# Patient Record
Sex: MALE | Race: WHITE
[De-identification: names, ages, dates, MRNs, and addresses within clinical notes are randomized per-mention and may not be internally consistent; named-entity substitution may affect disease eponyms.]

---
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
## 2018-11-05 NOTE — ED PHYSICIAN DOCUMENTATION
ED Addendum





- Addendum


Addendum: 





11/05/18 12:29 





The patient's care was turned over to me at 7 AM by the off going emergency 

physician.  The patient is currently awaiting placement into a behavioral center

and for social work to provide placement.  After social work had evaluated the 

patient The patient has decided that he does not want to be placed into a 

behavioral health center.  Social work feels that the patient contracts for 

safety and is a safe discharge at this point.  I reviewed the note from 

tele-psychiatry and explained to the patient that it appears that the patient 

would benefit from inpatient therapy since he does not appear safe at this time.

 The patient has decided that he does not want any treatment or transfer at our 

facility.  Per social work the patient is not obtainable.  The patient absconded

from the emergency department prior to discharge instructions being given.

## 2023-02-18 ENCOUNTER — HOSPITAL ENCOUNTER (OUTPATIENT)
Dept: HOSPITAL 76 - EMS | Age: 63
Discharge: ANOTHER HOSPITAL | End: 2023-02-18
Payer: OTHER GOVERNMENT

## 2023-02-18 ENCOUNTER — HOSPITAL ENCOUNTER (EMERGENCY)
Dept: HOSPITAL 76 - ED | Age: 63
Discharge: LEFT BEFORE DISCHARGE | End: 2023-02-18
Payer: OTHER GOVERNMENT

## 2023-02-18 VITALS — SYSTOLIC BLOOD PRESSURE: 138 MMHG | DIASTOLIC BLOOD PRESSURE: 99 MMHG

## 2023-02-18 DIAGNOSIS — R45.851: Primary | ICD-10-CM

## 2023-02-18 DIAGNOSIS — F32.A: ICD-10-CM

## 2023-02-18 DIAGNOSIS — Z87.891: ICD-10-CM

## 2023-02-18 PROCEDURE — 99283 EMERGENCY DEPT VISIT LOW MDM: CPT

## 2023-02-18 NOTE — ED PHYSICIAN DOCUMENTATION
PD HPI MHE





- Stated complaint


Stated Complaint: SI





- History obtained from


History obtained from: Patient





- Additional information


Additional information: 





62-year-old gentleman ran out of his medications recently.  He was on Wellbutrin

100 mg p.o. twice daily, Risperdal 1.5 mg at night, and trazodone 100 mg at 

night.  [facility]  [facility] today and he mentioned that he does have a suicidal ideation.  He says with 

no plan.  They called 911 and he is here for evaluation and treatment.  He 

admits to hopelessness and being in a bad place because of a pending eviction.  

He denies access to firearms, and he denies any suicidal plan.  He has attempted

suicide in the past but its been a few years, 2018.  He declines hospitalization

as he needs to go take care of his dogs Beth.





PD PAST MEDICAL HISTORY





- Past Medical History


GI: GERD


Psych: Bipolar disorder





- Past Surgical History


Past Surgical History: Yes


General: Cholecystectomy


Ortho: Other





- Present Medications


Home Medications: 


                                Ambulatory Orders











 Medication  Instructions  Recorded  Confirmed


 


Omeprazole 20 mg PO DAILY 10/14/16 11/04/18


 


QUEtiapine [SEROquel] 25 mg PO DAILY 10/14/16 11/04/18


 


Gabapentin 100 mg PO TID 11/04/18 11/04/18


 


traZODone [Desyrel] 100 mg PO DAILY PM 11/04/18 11/04/18


 


cephALEXin [Keflex] 500 mg PO BID #14 capsule 11/05/18 


 


Trazodone HCl 100 mg PO QPM #30 tablet 02/18/23 


 


buPROPion [Wellbutrin Sr] 100 mg PO BID #60 tablet 02/18/23 


 


risperiDONE [RisperDAL] 1 mg PO QPM #30 tablet 02/18/23 














- Allergies


Allergies/Adverse Reactions: 


                                    Allergies











Allergy/AdvReac Type Severity Reaction Status Date / Time


 


codeine Allergy  Emesis Verified 11/04/18 19:24














- Social History


Does the pt smoke?: No


Smoking Status: Former smoker


Does the pt drink ETOH?: Yes


Does the pt have substance abuse?: Yes





- Immunizations


Immunizations are current?: Yes





PD ED PE NORMAL





- Vitals


Vital signs reviewed: Yes





- General


General: Alert and oriented X 3, No acute distress





- HEENT


HEENT: PERRL, EOMI





- Neuro


Neuro: Alert and oriented X 3, CNs 2-12 intact, No motor deficit, No sensory 

deficit, Normal speech


Eye Opening: Spontaneous


Motor: Obeys Commands


Verbal: Oriented


GCS Score: 15





- Psych


Psych: Normal mood





Results





- Vitals


Vitals: 


                               Vital Signs - 24 hr











  02/18/23





  15:50


 


Temperature 37.3 C


 


Heart Rate 65


 


Respiratory 18





Rate 


 


Blood Pressure 138/99 H


 


O2 Saturation 96








                                     Oxygen











O2 Source                      Room air

















PD Medical Decision Making





- ED course


ED course: 





62-year-old gentleman presents with vague suicidal ideation without plan.  We 

are awaiting social work consultation when he walked out of the department 

without prescriptions or formal discharge.





Departure





- Departure


Disposition: ED [facility]


Clinical Impression: 


 Depression





Condition: Good


Record reviewed to determine appropriate education?: Yes


Instructions:  ED Depression


Prescriptions: 


risperiDONE [RisperDAL] 1 mg PO QPM #30 tablet


Trazodone HCl 100 mg PO QPM #30 tablet


buPROPion [Wellbutrin Sr] 100 mg PO BID #60 tablet

## 2023-03-31 ENCOUNTER — HOSPITAL ENCOUNTER (OUTPATIENT)
Dept: HOSPITAL 76 - EMS | Age: 63
Discharge: ANOTHER HOSPITAL | End: 2023-03-31
Payer: OTHER GOVERNMENT

## 2023-03-31 ENCOUNTER — HOSPITAL ENCOUNTER (EMERGENCY)
Dept: HOSPITAL 76 - ED | Age: 63
Discharge: HOME | End: 2023-03-31
Payer: OTHER GOVERNMENT

## 2023-03-31 VITALS — DIASTOLIC BLOOD PRESSURE: 99 MMHG | SYSTOLIC BLOOD PRESSURE: 148 MMHG

## 2023-03-31 DIAGNOSIS — Z04.6: Primary | ICD-10-CM

## 2023-03-31 DIAGNOSIS — R45.1: ICD-10-CM

## 2023-03-31 DIAGNOSIS — R45.851: ICD-10-CM

## 2023-03-31 DIAGNOSIS — N30.00: ICD-10-CM

## 2023-03-31 DIAGNOSIS — T50.906A: ICD-10-CM

## 2023-03-31 DIAGNOSIS — Z91.138: ICD-10-CM

## 2023-03-31 DIAGNOSIS — R45.851: Primary | ICD-10-CM

## 2023-03-31 DIAGNOSIS — Z20.822: ICD-10-CM

## 2023-03-31 DIAGNOSIS — F31.9: ICD-10-CM

## 2023-03-31 DIAGNOSIS — Z78.1: ICD-10-CM

## 2023-03-31 LAB
ALBUMIN DIAFP-MCNC: 4.7 G/DL (ref 3.2–5.5)
ALBUMIN/GLOB SERPL: 1.4 {RATIO} (ref 1–2.2)
ALP SERPL-CCNC: 107 IU/L (ref 42–121)
ALT SERPL W P-5'-P-CCNC: 20 IU/L (ref 10–60)
AMPHET UR QL SCN: NEGATIVE
ANION GAP SERPL CALCULATED.4IONS-SCNC: 10 MMOL/L (ref 6–13)
APAP SERPL-MCNC: < 10 UG/ML (ref 10–30)
AST SERPL W P-5'-P-CCNC: 23 IU/L (ref 10–42)
BARBITURATES UR QL SCN>300 NG/ML: NEGATIVE
BASOPHILS NFR BLD AUTO: 0.1 10^3/UL (ref 0–0.1)
BASOPHILS NFR BLD AUTO: 0.8 %
BENZODIAZ UR QL SCN: NEGATIVE
BILIRUB BLD-MCNC: 0.8 MG/DL (ref 0.2–1)
BUN SERPL-MCNC: 13 MG/DL (ref 6–20)
CALCIUM UR-MCNC: 9.8 MG/DL (ref 8.5–10.3)
CHLORIDE SERPL-SCNC: 92 MMOL/L (ref 101–111)
CLARITY UR REFRACT.AUTO: (no result)
CO2 SERPL-SCNC: 32 MMOL/L (ref 21–32)
COCAINE UR-SCNC: NEGATIVE UMOL/L
CREAT SERPLBLD-SCNC: 0.9 MG/DL (ref 0.6–1.2)
EOSINOPHIL # BLD AUTO: 0.1 10^3/UL (ref 0–0.7)
EOSINOPHIL NFR BLD AUTO: 0.8 %
ERYTHROCYTE [DISTWIDTH] IN BLOOD BY AUTOMATED COUNT: 12 % (ref 12–15)
ETHANOL BLD-MCNC: < 5 MG/DL
GFRSERPLBLD MDRD-ARVRAT: 86 ML/MIN/{1.73_M2} (ref 89–?)
GLOBULIN SER-MCNC: 3.3 G/DL (ref 2.1–4.2)
GLUCOSE SERPL-MCNC: 149 MG/DL (ref 70–100)
GLUCOSE UR QL STRIP.AUTO: NEGATIVE MG/DL
HCT VFR BLD AUTO: 47.6 % (ref 42–52)
HGB UR QL STRIP: 16.8 G/DL (ref 14–18)
KETONES UR QL STRIP.AUTO: NEGATIVE MG/DL
LIPASE SERPL-CCNC: 37 U/L (ref 22–51)
LYMPHOCYTES # SPEC AUTO: 1.5 10^3/UL (ref 1.5–3.5)
LYMPHOCYTES NFR BLD AUTO: 19.2 %
MCH RBC QN AUTO: 31.9 PG (ref 27–31)
MCHC RBC AUTO-ENTMCNC: 35.3 G/DL (ref 32–36)
MCV RBC AUTO: 90.3 FL (ref 80–94)
METHADONE UR QL SCN: NEGATIVE
METHAMPHET UR QL SCN: NEGATIVE
MONOCYTES # BLD AUTO: 0.6 10^3/UL (ref 0–1)
MONOCYTES NFR BLD AUTO: 7 %
NEUTROPHILS # BLD AUTO: 5.8 10^3/UL (ref 1.5–6.6)
NEUTROPHILS # SNV AUTO: 8 X10^3/UL (ref 4.8–10.8)
NEUTROPHILS NFR BLD AUTO: 72.1 %
NITRITE UR QL STRIP.AUTO: POSITIVE
NRBC # BLD AUTO: 0 /100WBC
NRBC # BLD AUTO: 0 X10^3/UL
OPIATES UR QL SCN: NEGATIVE
PDW BLD AUTO: 9 FL (ref 7.4–11.4)
PH UR STRIP.AUTO: 6 PH (ref 5–7.5)
PLATELET # BLD: 225 10^3/UL (ref 130–450)
POTASSIUM SERPL-SCNC: 3.6 MMOL/L (ref 3.5–5)
PROT SPEC-MCNC: 8 G/DL (ref 6.7–8.2)
PROT UR STRIP.AUTO-MCNC: (no result) MG/DL
RBC # UR STRIP.AUTO: NEGATIVE /UL
RBC # URNS HPF: (no result) /HPF (ref 0–5)
RBC MAR: 5.27 10^6/UL (ref 4.7–6.1)
SALICYLATES SERPL-MCNC: < 6 MG/DL
SODIUM SERPLBLD-SCNC: 134 MMOL/L (ref 135–145)
SP GR UR STRIP.AUTO: 1.02 (ref 1–1.03)
SQUAMOUS URNS QL MICRO: (no result)
THC UR QL SCN: POSITIVE
UROBILINOGEN UR QL STRIP.AUTO: (no result) E.U./DL
UROBILINOGEN UR STRIP.AUTO-MCNC: NEGATIVE MG/DL
VOLATILE DRUGS POS SERPL SCN: (no result)
WBC # UR MANUAL: >25 /HPF (ref 0–3)

## 2023-03-31 PROCEDURE — 87181 SC STD AGAR DILUTION PER AGT: CPT

## 2023-03-31 PROCEDURE — 80320 DRUG SCREEN QUANTALCOHOLS: CPT

## 2023-03-31 PROCEDURE — 87077 CULTURE AEROBIC IDENTIFY: CPT

## 2023-03-31 PROCEDURE — 81003 URINALYSIS AUTO W/O SCOPE: CPT

## 2023-03-31 PROCEDURE — 81001 URINALYSIS AUTO W/SCOPE: CPT

## 2023-03-31 PROCEDURE — 80329 ANALGESICS NON-OPIOID 1 OR 2: CPT

## 2023-03-31 PROCEDURE — 84443 ASSAY THYROID STIM HORMONE: CPT

## 2023-03-31 PROCEDURE — 87635 SARS-COV-2 COVID-19 AMP PRB: CPT

## 2023-03-31 PROCEDURE — 99284 EMERGENCY DEPT VISIT MOD MDM: CPT

## 2023-03-31 PROCEDURE — 80307 DRUG TEST PRSMV CHEM ANLYZR: CPT

## 2023-03-31 PROCEDURE — 36415 COLL VENOUS BLD VENIPUNCTURE: CPT

## 2023-03-31 PROCEDURE — 99283 EMERGENCY DEPT VISIT LOW MDM: CPT

## 2023-03-31 PROCEDURE — 87086 URINE CULTURE/COLONY COUNT: CPT

## 2023-03-31 PROCEDURE — 96372 THER/PROPH/DIAG INJ SC/IM: CPT

## 2023-03-31 PROCEDURE — 80306 DRUG TEST PRSMV INSTRMNT: CPT

## 2023-03-31 PROCEDURE — 80053 COMPREHEN METABOLIC PANEL: CPT

## 2023-03-31 PROCEDURE — 83690 ASSAY OF LIPASE: CPT

## 2023-03-31 PROCEDURE — 84480 ASSAY TRIIODOTHYRONINE (T3): CPT

## 2023-03-31 PROCEDURE — 85025 COMPLETE CBC W/AUTO DIFF WBC: CPT

## 2023-03-31 PROCEDURE — 84439 ASSAY OF FREE THYROXINE: CPT

## 2023-03-31 NOTE — EXTERNAL MEDICAL SUMMARY RPT
Continuity of Care Document

                            Created on:2023



Patient:HARPAL FULLER

Sex:Male

:1960

External Reference #:[id number]





Demographics







                          Address                   [address] [place] [address]

 

                          Phone                     Unavailable

 

                          Preferred Language        English

 

                          Marital Status            

 

                          Hindu Affiliation     Unknown

 

                          Race                      Unknown

 

                          Ethnic Group              Unknown









Author







                          Organization              [facility]

 

                          [place] [address]

 

                          Phone                     [phone]









Care Team Providers







                    Name                Role                Phone

 

                    Leo Hernandez     Unavailable         Unavailable









Allergies and Intolerances







                 date            description     facility        type

 

                 (no date)       No Known Drug Allergies  [facility]  (unkn

own)







Encounters

No information.



Functional Status

No information.



Immunizations

No information.



Medications

No information.



Problems







                     date                description         facility

 

                     2023 00:00    Unilateral incarcerated inguinal hernia

  [facility]

 

                     2023 08:57    Bilateral inguinal hernia, without obst

ruction  [facility]



                                        or gangrene,        







Procedures







                     date                description         facility

 

                     2023 00:00    US scrotum          [facility]

 

                     2023 00:00    Computed tomography of abdomen and pelv

is  [facility]



                                        without contrast    

 

                     2023 00:00    Hernia Repair - Inguinal (Right)  [facility]







Results/Labs







           test      date      author    facility  value     unit      interpret

ation









                                         Result panel 1









           (unknown)  (no date)  (unknown)  [facility]    (no value)  (units    (unk

nown)



                                        [facility]            unknown)  









                                         Result panel 2









           (unknown)  (no date)  (unknown)  [facility]    (no value)  (units    (unk

nown)



                                        Hospital            unknown)  









                                         Result panel 3









           (unknown)  (no date)  (unknown)  [facility]    (no value)  (units    (unk

nown)



                                        Hospital            unknown)  









                                         Result panel 4









           (unknown)  (no date)  (unknown)  [facility]    (no value)  (units    (unk

nown)



                                        Hospital            unknown)  









                                         Result panel 5









           (unknown)  (no date)  (unknown)  [facility]    (no value[facility]  (units    (unk

nown)



                                        Hospital            unknown)  









                                         Result panel 6









           (unknown)  (no date)  (unknown)  [facility]    (no value)  (units    (unk

nown)



                                        Hospital            unknown)  









                                         Result panel 7









           (unknown)  (no date)  (unknown)  [facility]    (no value)  (units    (unk

nown)



                                        Hospital            unknown)  









                                         Result panel 8









           (unknown)  (no date)  (unknown)  [facility]    (no value)  (units    (unk

nown)



                                        Hospital            unknown)  









                                         Result panel 9









           (unknown)  (no date)  (unknown)  [facility]    (no value[facility]  (units    (unk

nown)



                                        Hospital            unknown)  









                                         Result panel 10









           (unknown)  (no date)  (unknown)  [facility]    (no value)  (units    (unk

nown)



                                        Hospital            unknown)  









                                         Result panel 11









           (unknown)  (no date)  (unknown)  [facility]    (no value[facility]  (units    (unk

nown)



                                        Hospital            unknown)  









                                         Result panel 12









           (unknown)  (no date)  (unknown)  [facility]    (no value[facility]  (units    (unk

nown)



                                        Hospital            unknown)  









                                         Result panel 13









           (unknown)  (no date)  (unknown)  [facility]    (no value)  (units    (unk

nown)



                                        Hospital            unknown)  









                                         Result panel 14









           (unknown)  (no date)  (unknown)  [facility]    (no value)  (units    (unk

nown)



                                        Hospital            unknown)  









                                         Result panel 15









           (unknown)  (no date)  (unknown)  [facility]    (no value)  (units    (unk

nown)



                                        Hospital            unknown)  









                                         Result panel 16









           (unknown)  (no date)  (unknown)  [facility]    (no value)  (units    (unk

nown)



                                        Hospital            unknown)  









                                         Result panel 17









           (unknown)  (no date)  (unknown)  [facility]    (no value)  (units    (unk

nown)



                                        Hospital            unknown)  









                                         Result panel 18









           (unknown)  (no date)  (unknown)  [facility]    (no value)  (units    (unk

nown)



                                        Hospital            unknown)  









                                         Result panel 19









           (unknown)  (no date)  (unknown)  [facility]    (no value)  (units    (unk

nown)



                                        Hospital            unknown)  









                                         Result panel 20









           (unknown)  (no date)  (unknown)  [facility]    (no value)  (units    (unk

nown)



                                        Hospital            unknown)  









                                         Result panel 21









           (unknown)  (no date)  (unknown)  [facility]    (no value)  (units    (unk

nown)



                                        Hospital            unknown)  









                                         Result panel 22









           (unknown)  (no date)  (unknown)  [facility]    (no value[facility]  (units    (unk

nown)



                                        Hospital            unknown)  









                                         Result panel 23









           (unknown)  (no date)  (unknown)  [facility]    (no value[facility]  (units    (unk

nown)



                                        Hospital            unknown)  









                                         Result panel 24









           (unknown)  (no date)  (unknown)  [facility] value[facility]  (units    (unk

nown)



                                        Hospital            unknown)  









                                         Result panel 25









           (unknown)  (no date)  (unknown)  [facility]    (no value[facility]  (units    (unk

nown)



                                        Hospital            unknown)  









                                         Result panel 26









           (unknown)  (no date)  (unknown)  [facility]    (no value[facility]  (units    (unk

nown)



                                        Hospital            unknown)  









                                         Result panel 27









           (unknown)  (no date)  (unknown)  [facility] value[facility]  (units    (unk

nown)



                                        Hospital            unknown)  









                                         Result panel 28









           (unknown)  (no date)  (unknown)  [facility]    (no value[facility]  (units    (unk

nown)



                                        Hospital            unknown)  









                                         Result panel 29









           (unknown)  (no date)  (unknown)  [facility]    (no value[facility]  (units    (unk

nown)



                                        Hospital            unknown)  









                                         Result panel 30









           (unknown)  (no date)  (unknown)  [facility]    (no value[facility]  (units    (unk

nown)



                                        Hospital            unknown)  









                                         Result panel 31









           (unknown)  (no date)  (unknown)  [facility]    (no value)  (units    (unk

nown)



                                        Hospital            unknown)  









                                         Result panel 32









           (unknown)  (no date)  (unknown)  [facility]    (no value[facility]  (units    (unk

nown)



                                        Hospital            unknown)  









                                         Result panel 33









           (unknown)  (no date)  (unknown)  [facility]    (no value[facility]  (units    (unk

nown)



                                        Hospital            unknown)  









                                         Result panel 34









           (unknown)  (no date)  (unknown)  [facility]    (no value[facility]  (units    (unk

nown)



                                        Hospital            unknown)  









                                         Result panel 35









           (unknown)  (no date)  (unknown)  [facility]    (no value[facility]  (units    (unk

nown)



                                        Hospital            unknown)  









                                         Result panel 36









           (unknown)  (no date)  (unknown)  [facility] value[facility]  (units    (unk

nown)



                                        Hospital            unknown)  









                                         Result panel 37









           (unknown)  (no date)  (unknown)  [facility] value[facility]  (units    (unk

nown)



                                        Hospital            unknown)  









                                         Result panel 38









           (unknown)  (no date)  (unknown)  [facility]    (no value[facility]  (units    (unk

nown)



                                        Hospital            unknown)  









                                         Result panel 39









           (unknown)  (no date)  (unknown)  [facility]    (no value[facility]  (units    (unk

nown)



                                        Hospital            unknown)  









                                         Result panel 40









           (unknown)  (no date)  (unknown)  [facility] value[facility]  (units    (unk

nown)



                                        Hospital            unknown)  









                                         Result panel 41









           (unknown)  (no date)  (unknown)  [facility] value[facility]  (units    (unk

nown)



                                        Hospital            unknown)  









                                         Result panel 42









           (unknown)  (no date)  (unknown)  [facility] value[facility]  (units    (unk

nown)



                                        Hospital            unknown)  









                                         Result panel 43









           (unknown)  (no date)  (unknown)  [facility] value[facility]  (units    (unk

nown)



                                        Hospital            unknown)  









                                         Result panel 44









           (unknown)  (no date)  (unknown)  [facility]    (no value[facility]  (units    (unk

nown)



                                        Hospital            unknown)  









                                         Result panel 45









           (unknown)  (no date)  (unknown)  [facility]    (no value)  (units    (unk

nown)



                                        Hospital            unknown)  









                                         Result panel 46









           (unknown)  (no date)  (unknown)  [facility]    (no value)  (units    (unk

nown)



                                        Hospital            unknown)  









                                         Result panel 47









           (unknown)  (no date)  (unknown)  [facility]    (no value)  (units    (unk

nown)



                                        Hospital            unknown)  









                                         Result panel 48









           (unknown)  (no date)  (unknown)  [facility]    (no value)  (units    (unk

nown)



                                        Hospital            unknown)  









                                         Result panel 49









           (unknown)  (no date)  (unknown)  [facility]    (no value)  (units    (unk

nown)



                                        Hospital            unknown)  









                                         Result panel 50









           (unknown)  (no date)  (unknown)  [facility]    (no value)  (units    (unk

nown)



                                        Hospital            unknown)  









                                         Result panel 51









           (unknown)  (no date)  (unknown)  [facility]    (no value)  (units    (unk

nown)



                                        Hospital            unknown)  









                                         Result panel 52









           (unknown)  (no date)  (unknown)  [facility]    (no value)  (units    (unk

nown)



                                        Hospital            unknown)  









                                         Result panel 53









           (unknown)  (no date)  (unknown)  [facility]    (no value)  (units    (unk

nown)



                                        Hospital            unknown)  









                                         Result panel 54









           (unknown)  (no date)  (unknown)  [facility]    (no value)  (units    (unk

nown)



                                        Hospital            unknown)  









                                         Result panel 55









           (unknown)  (no date)  (unknown)  [facility]    (no value)  (units    (unk

nown)



                                        Hospital            unknown)  









                                         Result panel 56









           (unknown)  (no date)  (unknown)  [facility]    (no value)  (units    (unk

nown)



                                        Hospital            unknown)  









                                         Result panel 57









           (unknown)  (no date)  (unknown)  [facility]    (no value)  (units    (unk

nown)



                                        Hospital            unknown)  









                                         Result panel 58









           (unknown)  (no date)  (unknown)  [facility]    (no value)  (units    (unk

nown)



                                        Hospital            unknown)  









                                         Result panel 59









           (unknown)  (no date)  (unknown)  [facility]    (no value)  (units    (unk

nown)



                                        Hospital            unknown)  









                                         Result panel 60









           (unknown)  (no date)  (unknown)  [facility]    (no value)  (units    (unk

nown)



                                        Hospital            unknown)  









                                         Result panel 61









           (unknown)  (no date)  (unknown)  [facility]    (no value)  (units    (unk

nown)



                                        Hospital            unknown)  









                                         Result panel 62









           (unknown)  (no date)  (unknown)  [facility]    (no value)  (units    (unk

nown)



                                        Hospital            unknown)  









                                         Result panel 63









           (unknown)  (no date)  (unknown)  [facility]    (no value)  (units    (unk

nown)



                                        Hospital            unknown)  









                                         Result panel 64









           (unknown)  (no date)  (unknown)  [facility]    (no value)  (units    (unk

nown)



                                        Hospital            unknown)  









                                         Result panel 65









           (unknown)  (no date)  (unknown)  [facility]    (no value)  (units    (unk

nown)



                                        Hospital            unknown)  









                                         Result panel 66









           (unknown)  (no date)  (unknown)  [facility]    (no value)  (units    (unk

nown)



                                        Hospital            unknown)  









                                         Result panel 67









           (unknown)  (no date)  (unknown)  [facility]    (no value)  (units    (unk

nown)



                                        Hospital            unknown)  









                                         Result panel 68









           (unknown)  (no date)  (unknown)  [facility]    (no value)  (units    (unk

nown)



                                        Hospital            unknown)  









                                         Result panel 69









           (unknown)  (no date)  (unknown)  [facility]    (no value)  (units    (unk

nown)



                                        Hospital            unknown)  









                                         Result panel 70









           (unknown)  (no date)  (unknown)  [facility]    (no value)  (units    (unk

nown)



                                        Hospital            unknown)  









                                         Result panel 71









           (unknown)  (no date)  (unknown)  [facility]    (no value)  (units    (unk

nown)



                                        Hospital            unknown)  









                                         Result panel 72









           (unknown)  (no date)  (unknown)  [facility]    (no value)  (units    (unk

nown)



                                        Hospital            unknown)  









                                         Result panel 73









           (unknown)  (no date)  (unknown)  [facility]    (no value)  (units    (unk

nown)



                                        Hospital            unknown)  









                                         Result panel 74









           (unknown)  (no date)  (unknown)  [facility]    (no value)  (units    (unk

nown)



                                        Hospital            unknown)  









                                         Result panel 75









           (unknown)  (no date)  (unknown)  [facility]    (no value)  (units    (unk

nown)



                                        Hospital            unknown)  









                                         Result panel 76









           (unknown)  (no date)  (unknown)  [facility]    (no value)  (units    (unk

nown)



                                        Hospital            unknown)  









                                         Result panel 77









           (unknown)  (no date)  (unknown)  [facility]    (no value)  (units    (unk

nown)



                                        Hospital            unknown)  









                                         Result panel 78









           (unknown)  (no date)  (unknown)  (unknown)  (no value)  (units    (un

known)



                                                            unknown)  

 

           (unknown)  (no date)  (unknown)  (unknown)  010       (units    (unkn

own)



                                                            unknown)  

 

           (unknown)  (no date)  (unknown)  (unknown)  23  (units    (unkn

own)



                                                            unknown)  

 

           (unknown)  (no date)  (unknown)  (unknown)  21:25     (units    (unkn

own)



                                                            unknown)  

 

           (unknown)  (no date)  (unknown)  (unknown)  Age/Sex: 62 /  (units    

(unknown)



                                                  M         unknown)  

 

           (unknown)  (no date)  (unknown)  (unknown)  Allergies  (units    (unk

nown)



                                                            unknown)  

 

           (unknown)  (no date)  (unknown)  (unknown)  Allergy/AdvRea  (units   

 (unknown)



                                                  c Type Severity unknown)  



                                                  Reaction Status           



                                                  Date / Time           

 

           (unknown)  (no date)  (unknown)  (unknown)  Blood Pressure  (units   

 (unknown)



                                                  142/92 H  unknown)  



                                                  23 21:25           

 

           (unknown)  (no date)  (unknown)  (unknown)  Blood Pressure  (units   

 (unknown)



                                                  142/92 H  unknown)  

 

           (unknown)  (no date)  (unknown)  (unknown)  Chief     (units    (unkn

own)



                                                  complaint: unknown)  



                                                  Abdominal Pain           

 

           (unknown)  (no date)  (unknown)  (unknown)  Course    (units    (unkn

own)



                                                            unknown)  

 

           (unknown)  (no date)  (unknown)  (unknown)  :      (units    (unkn

own)



                                                  1960 unknown)  



                                                  Acct:[id number]           

 

           (unknown)  (no date)  (unknown)  (unknown)  Date of   (units    (unkn

own)



                                                  Service:  unknown)  



                                                  23            

 

           (unknown)  (no date)  (unknown)  (unknown)  ER Physician:  (units    

(unknown)



                                                  Leo Hernandez unknown)  



                                                  D.O.                

 

           (unknown)  (no date)  (unknown)  (unknown)  Emergency  (units    (unk

nown)



                                                  Report    unknown)  

 

           (unknown)  (no date)  (unknown)  (unknown)  Exam      (units    (unkn

own)



                                                            unknown)  

 

           (unknown)  (no date)  (unknown)  (unknown)  General   (units    (unkn

own)



                                                            unknown)  

 

           (unknown)  (no date)  (unknown)  (unknown)  HPI - General  (units    

(unknown)



                                                  Adult     unknown)  

 

           (unknown)  (no date)  (unknown)  (unknown)  Initial Vital  (units    

(unknown)



                                                  Signs     unknown)  

 

           (unknown)  (no date)  (unknown)  (unknown)  Initial Vital  (units    

(unknown)



                                                  Signs:    unknown)  

 

           (unknown)  (no date)  (unknown)  (unknown)  [facility]    (units    (unkn

own)



                                                  [facility] unknown)  



                                                  [address] [place] [address]           



                                                  [id number]               

 

           (unknown)  (no date)  (unknown)  (unknown)  Mode of   (units    (unkn

own)



                                                  arrival: EMS unknown)  

 

           (unknown)  (no date)  (unknown)  (unknown)  No Known Drug  (units    

(unknown)



                                                  Allergies unknown)  



                                                  Allergy             



                                                  Verified            



                                                  23 21:43           

 

           (unknown)  (no date)  (unknown)  (unknown)  Oxygen    (units    (unkn

own)



                                                  Delivery Method unknown)  



                                                  23 21:25           

 

           (unknown)  (no date)  (unknown)  (unknown)  Oxygen    (units    (unkn

own)



                                                  Delivery Method unknown)  



                                                  Room Air            

 

           (unknown)  (no date)  (unknown)  (unknown)  Patient:  (units    (unkn

own)



                                                  Harpal Fuller unknown)  



                                                  MR#: [id number]           

 

           (unknown)  (no date)  (unknown)  (unknown)  Pulse Oximetry  (units   

 (unknown)



                                                  94 23 unknown)  



                                                  21:25               

 

           (unknown)  (no date)  (unknown)  (unknown)  Pulse Oximetry  (units   

 (unknown)



                                                  94        unknown)  

 

           (unknown)  (no date)  (unknown)  (unknown)  Pulse Rate 73  (units    

(unknown)



                                                  23 21:25 unknown)  

 

           (unknown)  (no date)  (unknown)  (unknown)  Pulse Rate 73  (units    

(unknown)



                                                            unknown)  

 

           (unknown)  (no date)  (unknown)  (unknown)  Related Data  (units    (

unknown)



                                                            unknown)  

 

           (unknown)  (no date)  (unknown)  (unknown)  Respiratory  (units    (u

nknown)



                                                  Rate 20   unknown)  



                                                  23 21:25           

 

           (unknown)  (no date)  (unknown)  (unknown)  Respiratory  (units    (u

nknown)



                                                  Rate 20   unknown)  

 

           (unknown)  (no date)  (unknown)  (unknown)  Signed By:  (units    (un

known)



                                                            unknown)  

 

           (unknown)  (no date)  (unknown)  (unknown)  Source:   (units    (unkn

own)



                                                  patient   unknown)  

 

           (unknown)  (no date)  (unknown)  (unknown)  Stated    (units    (unkn

own)



                                                  complaint: RLQ/ unknown)  



                                                  Testical pain           

 

           (unknown)  (no date)  (unknown)  (unknown)  Temperature  (units    (u

nknown)



                                                  98.2 F 23 unknown)  



                                                  21:25               

 

           (unknown)  (no date)  (unknown)  (unknown)  Temperature  (units    (u

nknown)



                                                  98.2 F    unknown)  

 

           (unknown)  (no date)  (unknown)  (unknown)  Time Seen by  (units    (

unknown)



                                                  Provider: unknown)  



                                                  23 21:58           

 

           (unknown)  (no date)  (unknown)  (unknown)  Vital Signs -  (units    

(unknown)



                                                  8 hr      unknown)  

 

           (unknown)  (no date)  (unknown)  (unknown)  Vital Signs  (units    (u

nknown)



                                                            unknown)  

 

           (unknown)  (no date)  (unknown)  (unknown)  Vital signs:  (units    (

unknown)



                                                            unknown)  









                                         Result panel 79









           (unknown)  (no date)  (unknown)  (unknown)  0.7       %         (unkn

own)

 

           (unknown)  (no date)  (unknown)  (unknown)  1.8       %         (unkn

own)

 

           (unknown)  (no date)  (unknown)  (unknown)  100       /ul       (unkn

own)

 

           (unknown)  (no date)  (unknown)  (unknown)  14.0      %         (unkn

own)

 

           (unknown)  (no date)  (unknown)  (unknown)  16.3      g/dl      (unkn

own)

 

           (unknown)  (no date)  (unknown)  (unknown)  1700      /ul       (unkn

own)

 

           (unknown)  (no date)  (unknown)  (unknown)  19.6      %         (unkn

own)

 

           (unknown)  (no date)  (unknown)  (unknown)  200       /ul       (unkn

own)

 

           (unknown)  (no date)  (unknown)  (unknown)  205       x10 3/ul  (unkn

own)

 

           (unknown)  (no date)  (unknown)  (unknown)  31.7      pg        (unkn

own)

 

           (unknown)  (no date)  (unknown)  (unknown)  34.9      %         (unkn

own)

 

           (unknown)  (no date)  (unknown)  (unknown)  46.6      %         (unkn

own)

 

           (unknown)  (no date)  (unknown)  (unknown)  5.13      x10 6/ul  (unkn

own)

 

           (unknown)  (no date)  (unknown)  (unknown)  500       /ul       (unkn

own)

 

           (unknown)  (no date)  (unknown)  (unknown)  6.2       %         (unkn

own)

 

           (unknown)  (no date)  (unknown)  (unknown)  6300      /ul       (unkn

own)

 

           (unknown)  (no date)  (unknown)  (unknown)  71.7      %         (unkn

own)

 

           (unknown)  (no date)  (unknown)  (unknown)  8.8       x10 3/ul  (unkn

own)

 

           (unknown)  (no date)  (unknown)  (unknown)  90.8      fl        (unkn

own)









                                         Result panel 80









           (unknown)  (no date)  (unknown)  (unknown)  > 60      ml/min    (unkn

own)

 

           (unknown)  (no date)  (unknown)  (unknown)  > 60      ml/min    (unkn

own)

 

           (unknown)  (no date)  (unknown)  (unknown)  0.81      mg/dl     (unkn

own)

 

           (unknown)  (no date)  (unknown)  (unknown)  105       mmol/l    (unkn

own)

 

           (unknown)  (no date)  (unknown)  (unknown)  12        mg/dl     (unkn

own)

 

           (unknown)  (no date)  (unknown)  (unknown)  138       mmol/l    (unkn

own)

 

           (unknown)  (no date)  (unknown)  (unknown)  14.8      (units unknown)

  (unknown)

 

           (unknown)  (no date)  (unknown)  (unknown)  192       mg/dl     (unkn

own)

 

           (unknown)  (no date)  (unknown)  (unknown)  192       mg/dl     (unkn

own)

 

           (unknown)  (no date)  (unknown)  (unknown)  21        mmol/l    (unkn

own)

 

           (unknown)  (no date)  (unknown)  (unknown)  4.2       mmol/l    (unkn

own)

 

           (unknown)  (no date)  (unknown)  (unknown)  4.2       mmol/l    (unkn

own)

 

           (unknown)  (no date)  (unknown)  (unknown)  9.2       mg/dl     (unkn

own)









                                         Result panel 81









           (unknown)  (no date)  (unknown)  (unknown)  Negative  (units    (unkn

own)



                                                            unknown)  

 

           (unknown)  (no date)  (unknown)  (unknown)  Negative  (units    (unkn

own)



                                                            unknown)  









                                         Result panel 82









           (unknown)  (no       (unknown)  (unknown)  (no value)  (units    (unk

nown)



                    date)                                   unknown)  

 

           (unknown)  (no       (unknown)  (unknown)  23  (units    (unkno

wn)



                    date)                                   unknown)  

 

           (unknown)  (no       (unknown)  (unknown)  1.        (units    (unkno

wn)



                    date)                         Bowel-containing unknown)  



                                                  inguinal hernia           



                                                  demonstrated           



                                                  lateral to the           



                                                  right               

 

           (unknown)  (no       (unknown)  (unknown)  1. Small right  (units    

(unknown)



                    date)                         inguinal hernia unknown)  



                                                  with partial           



                                                  herniation of a           



                                                  small bowel loop.           

 

           (unknown)  (no       (unknown)  (unknown)  [address]  (units  

  (unknown)



                    date)                                   unknown)  

 

           (unknown)  (no       (unknown)  (unknown)  2. Mild segmental  (units 

   (unknown)



                    date)                         wall thickening in unknown)  



                                                  the sigmoid colon           



                                                  suggestive of a           



                                                  mild                

 

           (unknown)  (no       (unknown)  (unknown)  3. Colonic  (units    (unk

nown)



                    date)                         diverticulosis unknown)  



                                                  without definite           



                                                  acute               



                                                  diverticulitis.           

 

           (unknown)  (no       (unknown)  (unknown)  4. No evidence of  (units 

   (unknown)



                    date)                         appendicitis. unknown)  

 

           (unknown)  (no       (unknown)  (unknown)  9010      (units    (unkno

wn)



                    date)                                   unknown)  

 

           (unknown)  (no       (unknown)  (unknown)  ABDOMEN:  (units    (unkno

wn)



                    date)                                   unknown)  

 

           (unknown)  (no       (unknown)  (unknown)  Abdominal Nodes:  (units  

  (unknown)



                    date)                         No retroperitoneal unknown)  



                                                  or mesenteric           



                                                  adenopathy by size           



                                                  criteria.           

 

           (unknown)  (no       (unknown)  (unknown)  Accession Number:  (units 

   (unknown)



                    date)                         [id number] unknown)  

 

           (unknown)  (no       (unknown)  (unknown)  Accession Number:  (units 

   (unknown)



                    date)                         [id number] unknown)  

 

           (unknown)  (no       (unknown)  (unknown)  Adrenal Glands:  (units   

 (unknown)



                    date)                         No adrenal unknown)  



                                                  nodules.            

 

           (unknown)  (no       (unknown)  (unknown)  Age/Sex: 62 / M  (units   

 (unknown)



                    date)                         Date of Service: unknown)  

 

           (unknown)  (no       (unknown)  (unknown)  [place]  (units    (

unknown)



                    date)                         [address]     unknown)  

 

           (unknown)  (no       (unknown)  (unknown)  Approved by:  (units    (u

nknown)



                    date)                         Dmitri Argueta, unknown)  



                                                  M.DAniceto on 2023           



                                                  at 2:06             

 

           (unknown)  (no       (unknown)  (unknown)  Approved by:  (units    (u

nknown)



                    date)                         Dmitri Argueta, unknown)  



                                                  MAnicetoDAniceto on 2023           



                                                  at 2:23             

 

           (unknown)  (no       (unknown)  (unknown)  Axial sections  (units    

(unknown)



                    date)                         were acquired from unknown)  



                                                  the lung bases to           



                                                  the pubic           



                                                  symphysis.           

 

           (unknown)  (no       (unknown)  (unknown)  Biliary ducts: No  (units 

   (unknown)



                    date)                         biliary ductal unknown)  



                                                  dilatation.           

 

           (unknown)  (no       (unknown)  (unknown)  Bladder:  (units    (unkno

wn)



                    date)                         Unremarkable. unknown)  

 

           (unknown)  (no       (unknown)  (unknown)  Bones: Visualized  (units 

   (unknown)



                    date)                         osseous structures unknown)  



                                                  demonstrate no           



                                                  suspicious focal           



                                                  lesions.            

 

           (unknown)  (no       (unknown)  (unknown)  COMPARISON: None.  (units 

   (unknown)



                    date)                                   unknown)  

 

           (unknown)  (no       (unknown)  (unknown)  CT Scan Report  (units    

(unknown)



                    date)                                   unknown)  

 

           (unknown)  (no       (unknown)  (unknown)  Color and pulse  (units   

 (unknown)



                    date)                         Doppler   unknown)  



                                                  interrogation was           



                                                  performed of both           



                                                  testicles.           

 

           (unknown)  (no       (unknown)  (unknown)  Coronal and  (units    (un

known)



                    date)                                   unknown)  

 

           (unknown)  (no       (unknown)  (unknown)  : 1960  (units   

 (unknown)



                    date)                         Acct:[id number] unknown)  

 

           (unknown)  (no       (unknown)  (unknown)  Dictated by:  (units    (u

nknown)



                    date)                         Dmitri Argueta, unknown)  



                                                  M.D. on 2023           



                                                  at 1:44             

 

           (unknown)  (no       (unknown)  (unknown)  Dictated by:  (units    (u

nknown)



                    date)                         Dmitri Argueta, unknown)  



                                                  M.D. on 2023           



                                                  at 2:18             

 

           (unknown)  (no       (unknown)  (unknown)  Doppler: Color  (units    

(unknown)



                    date)                         and pulse Doppler unknown)  



                                                  demonstrate normal           



                                                  and symmetric           



                                                  arterial            

 

           (unknown)  (no       (unknown)  (unknown)  Epididymis is  (units    (

unknown)



                    date)                         normal in overall unknown)  



                                                  size and            



                                                  morphology. No           



                                                  hydrocele or           

 

           (unknown)  (no       (unknown)  (unknown)  FINDINGS:  (units    (unkn

own)



                    date)                                   unknown)  

 

           (unknown)  (no       (unknown)  (unknown)  Gallbladder:  (units    (u

nknown)



                    date)                         Surgically absent. unknown)  

 

           (unknown)  (no       (unknown)  (unknown)  Heart: Heart is  (units   

 (unknown)



                    date)                         normal in size. unknown)  



                                                  There is a small           



                                                  hiatal hernia.           

 

           (unknown)  (no       (unknown)  (unknown)  IMPRESSION:  (units    (un

known)



                    date)                                   unknown)  

 

           (unknown)  (no       (unknown)  (unknown)  INDICATIONS: PAIN  (units 

   (unknown)



                    date)                                   unknown)  

 

           (unknown)  (no       (unknown)  (unknown)  INDICATIONS: abd  (units  

  (unknown)



                    date)                         pain      unknown)  

 

           (unknown)  (no       (unknown)  (unknown)  Image quality:  (units    

(unknown)



                    date)                         Excellent. unknown)  

 

           (unknown)  (no       (unknown)  (unknown)  [facility]  (units   

 (unknown)



                    date)                                   unknown)  

 

           (unknown)  (no       (unknown)  (unknown)  Kidneys and  (units    (un

known)



                    date)                         Ureters: No unknown)  



                                                  hydronephrosis.           



                                                  There is a           



                                                  nonobstructing           



                                                  stone               

 

           (unknown)  (no       (unknown)  (unknown)  Left: Testicle  (units    

(unknown)



                    date)                         measures 3.5 x 2.0 unknown)  



                                                  x 2.2 cm and           



                                                  appears             



                                                  homogeneous in           

 

           (unknown)  (no       (unknown)  (unknown)  Liver:    (units    (unkno

wn)



                    date)                         Noncontrast unknown)  



                                                  evaluation of the           



                                                  liver demonstrates           



                                                  no discrete           



                                                  hepatic             

 

           (unknown)  (no       (unknown)  (unknown)  Loc: AC 90B-1  (units    (

unknown)



                    date)                                   unknown)  

 

           (unknown)  (no       (unknown)  (unknown)  Lung bases: There  (units 

   (unknown)



                    date)                         is mild dependent unknown)  



                                                  atelectasis.           

 

           (unknown)  (no       (unknown)  (unknown)  Miscellaneous:  (units    

(unknown)



                    date)                         There is a small unknown)  



                                                  right inguinal           



                                                  hernia with           



                                                  partial herniation           

 

           (unknown)  (no       (unknown)  (unknown)  No        (units    (unkno

wn)



                    date)                                   unknown)  

 

           (unknown)  (no       (unknown)  (unknown)  Ordering  (units    (unkno

wn)



                    date)                         Provider: unknown)  



                                                  Leo Hernandez D.O.                

 

           (unknown)  (no       (unknown)  (unknown)  Overlying scrotal  (units 

   (unknown)



                    date)                         skin is normal in unknown)  



                                                  thickness. There           



                                                  is a bowel           



                                                  containing           

 

           (unknown)  (no       (unknown)  (unknown)  Overlying scrotal  (units 

   (unknown)



                    date)                         skin is normal in unknown)  



                                                  thickness.           

 

           (unknown)  (no       (unknown)  (unknown)  PELVIS:   (units    (unkno

wn)



                    date)                                   unknown)  

 

           (unknown)  (no       (unknown)  (unknown)  PROCEDURE: CT  (units    (

unknown)



                    date)                         ABDOMEN PELVIS WO unknown)  



                                                  CON                 

 

           (unknown)  (no       (unknown)  (unknown)  PROCEDURE: US  (units    (

unknown)



                    date)                         SCROTUM   unknown)  

 

           (unknown)  (no       (unknown)  (unknown)  Pancreas: There  (units   

 (unknown)



                    date)                         is mild dilatation unknown)  



                                                  of the pancreatic           



                                                  duct in the           



                                                  pancreatic           

 

           (unknown)  (no       (unknown)  (unknown)  Patient:  (units    (unkno

wn)



                    date)                         Harpal Fuller MR#: unknown)  



                                                  [id number]              

 

           (unknown)  (no       (unknown)  (unknown)  Pelvic Nodes: No  (units  

  (unknown)



                    date)                         enlarged lymph unknown)  



                                                  nodes.              

 

           (unknown)  (no       (unknown)  (unknown)  Pelvic Organs:  (units    

(unknown)



                    date)                         Unremarkable. unknown)  

 

           (unknown)  (no       (unknown)  (unknown)  Peritoneum: No  (units    

(unknown)



                    date)                         abnormal  unknown)  



                                                  intraperitoneal           



                                                  fluid. No free           



                                                  air.                

 

           (unknown)  (no       (unknown)  (unknown)  Procedure: CT  (units    (

unknown)



                    date)                         abdomen pelvis wo unknown)  



                                                  con                 

 

           (unknown)  (no       (unknown)  (unknown)  Procedure: US  (units    (

unknown)



                    date)                         scrotum   unknown)  

 

           (unknown)  (no       (unknown)  (unknown)  Real-time  (units    (unkn

own)



                    date)                         scanning was unknown)  



                                                  performed of the           



                                                  scrotum and           



                                                  testicles, with           



                                                  image               

 

           (unknown)  (no       (unknown)  (unknown)  Right: Testicle  (units   

 (unknown)



                    date)                         measures 4.1 x 2.4 unknown)  



                                                  x 2.6 cm and           



                                                  appears homogenous           



                                                  in                  

 

           (unknown)  (no       (unknown)  (unknown)  Signed    (units    (unkno

wn)



                    date)                                   unknown)  

 

           (unknown)  (no       (unknown)  (unknown)  Spleen: Normal in  (units 

   (unknown)



                    date)                         size.     unknown)  

 

           (unknown)  (no       (unknown)  (unknown)  Stomach and  (units    (un

known)



                    date)                         Bowel: Stomach and unknown)  



                                                  small bowel loops           



                                                  are normal in           



                                                  caliber and           

 

           (unknown)  (no       (unknown)  (unknown)  TECHNIQUE:  (units    (unk

nown)



                    date)                                   unknown)  

 

           (unknown)  (no       (unknown)  (unknown)  Ultrasound Report  (units 

   (unknown)



                    date)                                   unknown)  

 

           (unknown)  (no       (unknown)  (unknown)  Ventral Wall: No  (units  

  (unknown)



                    date)                         hernia.   unknown)  

 

           (unknown)  (no       (unknown)  (unknown)  Vessels: Aorta  (units    

(unknown)



                    date)                         and inferior vena unknown)  



                                                  cava are normal in           



                                                  size.               

 

           (unknown)  (no       (unknown)  (unknown)  approximately  (units    (

unknown)



                    date)                         500-600 [place] unknown)  



                                                  units.              

 

           (unknown)  (no       (unknown)  (unknown)  automated  (units    (unkn

own)



                    date)                         exposure control, unknown)  



                                                  adjustment of mA           



                                                  and/or kV           



                                                  according to           



                                                  patient             

 

           (unknown)  (no       (unknown)  (unknown)  bowel loop. No  (units    

(unknown)



                    date)                         evidence of unknown)  



                                                  associated bowel           



                                                  obstruction or           



                                                  strangulation.           

 

           (unknown)  (no       (unknown)  (unknown)  colitis.  (units    (unkno

wn)



                    date)                                   unknown)  

 

           (unknown)  (no       (unknown)  (unknown)  demonstrated  (units    (u

nknown)



                    date)                                   unknown)  

 

           (unknown)  (no       (unknown)  (unknown)  diverticulosis  (units    

(unknown)



                    date)                                   unknown)  

 

           (unknown)  (no       (unknown)  (unknown)  documentation.  (units    

(unknown)



                    date)                                   unknown)  

 

           (unknown)  (no       (unknown)  (unknown)  echotexture.  (units    (u

nknown)



                    date)                                   unknown)  

 

           (unknown)  (no       (unknown)  (unknown)  evidence of  (units    (un

known)



                    date)                         associated bowel unknown)  



                                                  obstruction or           



                                                  definite            



                                                  strangulation.           

 

           (unknown)  (no       (unknown)  (unknown)  flow in both  (units    (u

nknown)



                    date)                                   unknown)  

 

           (unknown)  (no       (unknown)  (unknown)  head      (units    (unkno

wn)



                    date)                                   unknown)  

 

           (unknown)  (no       (unknown)  (unknown)  hernia    (units    (unkno

wn)



                    date)                                   unknown)  

 

           (unknown)  (no       (unknown)  (unknown)  intravenous  (units    (un

known)



                    date)                         contrast. No unknown)  



                                                  peripancreatic fat           



                                                  stranding or fluid           



                                                  collections.           

 

           (unknown)  (no       (unknown)  (unknown)  lateral to the  (units    

(unknown)



                    date)                         right testicle unknown)  



                                                  measuring           



                                                  approximately 7.0           



                                                  x 2.4 x 7.1 cm.           

 

           (unknown)  (no       (unknown)  (unknown)  mass.     (units    (unkno

wn)



                    date)                                   unknown)  

 

           (unknown)  (no       (unknown)  (unknown)  measuring up to  (units   

 (unknown)



                    date)                         approximately 0.4 unknown)  



                                                  cm with evaluation           



                                                  limited in the           



                                                  absence of           

 

           (unknown)  (no       (unknown)  (unknown)  of a small  (units    (unk

nown)



                    date)                                   unknown)  

 

           (unknown)  (no       (unknown)  (unknown)  right kidney  (units    (u

nknown)



                    date)                         measuring up to unknown)  



                                                  0.6 cm. This           



                                                  demonstrates           



                                                  attenuation values           



                                                  of                  

 

           (unknown)  (no       (unknown)  (unknown)  sagittal  (units    (unkno

wn)



                    date)                         reformats were unknown)  



                                                  performed. For           



                                                  radiation dose           



                                                  reduction, the           



                                                  following           

 

           (unknown)  (no       (unknown)  (unknown)  size.     (units    (unkno

wn)



                    date)                                   unknown)  

 

           (unknown)  (no       (unknown)  (unknown)  testicle.  (units    (unkn

own)



                    date)                                   unknown)  

 

           (unknown)  (no       (unknown)  (unknown)  testicles.  (units    (unk

nown)



                    date)                                   unknown)  

 

           (unknown)  (no       (unknown)  (unknown)  thickness. The  (units    

(unknown)



                    date)                         appendix is normal unknown)  



                                                  in appearance.           



                                                  There is colonic           

 

           (unknown)  (no       (unknown)  (unknown)  varicoceles.  (units    (u

nknown)



                    date)                                   unknown)  

 

           (unknown)  (no       (unknown)  (unknown)  wall      (units    (unkno

wn)



                    date)                                   unknown)  

 

           (unknown)  (no       (unknown)  (unknown)  was used:  (units    (unkn

own)



                    date)                                   unknown)  

 

           (unknown)  (no       (unknown)  (unknown)  within the  (units    (unk

nown)



                    date)                         sigmoid colon unknown)  



                                                  suggestive of a           



                                                  mild colitis.           

 

           (unknown)  (no       (unknown)  (unknown)  within the  (units    (unk

nown)



                    date)                                   unknown)  

 

           (unknown)  (no       (unknown)  (unknown)  without definite  (units  

  (unknown)



                    date)                         acute     unknown)  



                                                  diverticulitis.           



                                                  Mild segmental           



                                                  wall thickening is           









                                         Result panel 83









           (unknown)  (no       (unknown)  (unknown)  (no value)  (units    (unk

nown)



                    date)                                   unknown)  

 

           (unknown)  (no       (unknown)  (unknown)  <Electronically  (units   

 (unknown)



                    date)                         signed by Leo Hernandez D.O.>           

 

           (unknown)  (no       (unknown)  (unknown)  010       (units    (unkno

wn)



                    date)                                   unknown)  

 

           (unknown)  (no       (unknown)  (unknown)  23  (units    (unkno

wn)



                    date)                         23  unknown)  



                                                  Range/Units           

 

           (unknown)  (no       (unknown)  (unknown)  23 21:40  (units    

(unknown)



                    date)                                   unknown)  

 

           (unknown)  (no       (unknown)  (unknown)  23 22:10  (units    

(unknown)



                    date)                                   unknown)  

 

           (unknown)  (no       (unknown)  (unknown)  23 22:15  (units    

(unknown)



                    date)                                   unknown)  

 

           (unknown)  (no       (unknown)  (unknown)  23  (units    (unkno

wn)



                    date)                                   unknown)  

 

           (unknown)  (no       (unknown)  (unknown)  23 0258  (units    (

unknown)



                    date)                                   unknown)  

 

           (unknown)  (no       (unknown)  (unknown)  21:25 23  (units    

(unknown)



                    date)                                   unknown)  

 

           (unknown)  (no       (unknown)  (unknown)  21:40 21:40  (units    (un

known)



                    date)                                   unknown)  

 

           (unknown)  (no       (unknown)  (unknown)  22:00     (units    (unkno

wn)



                    date)                                   unknown)  

 

           (unknown)  (no       (unknown)  (unknown)  Admin: 23  (units   

 (unknown)



                    date)                         22:28 Dose: 4 mg unknown)  

 

           (unknown)  (no       (unknown)  (unknown)  Admit Date/Time:  (units  

  (unknown)



                    date)                         23 22:11 unknown)  

 

           (unknown)  (no       (unknown)  (unknown)  Admit Provider:  (units   

 (unknown)



                    date)                         Greta Morgan unknown)  

 

           (unknown)  (no       (unknown)  (unknown)  Age/Sex: 62 / M  (units   

 (unknown)



                    date)                                   unknown)  

 

           (unknown)  (no       (unknown)  (unknown)  Allergies  (units    (unkn

own)



                    date)                                   unknown)  

 

           (unknown)  (no       (unknown)  (unknown)  Allergy/AdvReac  (units   

 (unknown)



                    date)                         Type Severity unknown)  



                                                  Reaction Status           



                                                  Date / Time           

 

           (unknown)  (no       (unknown)  (unknown)  Auscultation:  (units    (

unknown)



                    date)                         clear to  unknown)  



                                                  auscultation           



                                                  bilaterally           

 

           (unknown)  (no       (unknown)  (unknown)  BUN 12 (9-20)  (units    (

unknown)



                    date)                         mg/dL     unknown)  

 

           (unknown)  (no       (unknown)  (unknown)  BUN/Creatinine  (units    

(unknown)



                    date)                         Ratio 14.8 (6-22) unknown)  

 

           (unknown)  (no       (unknown)  (unknown)  Basic Metabolic  (units   

 (unknown)



                    date)                         Panel Stat unknown)  

 

           (unknown)  (no       (unknown)  (unknown)  Baso # (Auto)  (units    (

unknown)



                    date)                         100 (0-100) /uL unknown)  

 

           (unknown)  (no       (unknown)  (unknown)  Baso % (Auto)  (units    (

unknown)



                    date)                         0.7 (0-2) % unknown)  

 

           (unknown)  (no       (unknown)  (unknown)  Blood Pressure  (units    

(unknown)



                    date)                         142/92 H 23 unknown)  



                                                  21:25               

 

           (unknown)  (no       (unknown)  (unknown)  Blood Pressure  (units    

(unknown)



                    date)                         142/92 H  unknown)  

 

           (unknown)  (no       (unknown)  (unknown)  COVID19 -Nasal  (units    

(unknown)



                    date)                         RAPID/Pre-Proc unknown)  



                                                  Stat                

 

           (unknown)  (no       (unknown)  (unknown)  CT scan -  (units    (unkn

own)



                    date)                         abdomen/pelvis: unknown)  

 

           (unknown)  (no       (unknown)  (unknown)  Calcium 9.2  (units    (un

known)



                    date)                         (8.4-10.2) mg/dL unknown)  

 

           (unknown)  (no       (unknown)  (unknown)  Carbon Dioxide  (units    

(unknown)



                    date)                         21 L (22-32) unknown)  



                                                  mmol/L              

 

           (unknown)  (no       (unknown)  (unknown)  Cardio    (units    (unkno

wn)



                    date)                                   unknown)  

 

           (unknown)  (no       (unknown)  (unknown)  Chief complaint:  (units  

  (unknown)



                    date)                         Abdominal Pain unknown)  

 

           (unknown)  (no       (unknown)  (unknown)  Chloride 105  (units    (u

nknown)



                    date)                         () mmol/L unknown)  

 

           (unknown)  (no       (unknown)  (unknown)  Clinical  (units    (unkno

wn)



                    date)                         Impression: unknown)  

 

           (unknown)  (no       (unknown)  (unknown)  Complete Blood  (units    

(unknown)



                    date)                         Count AUTO DIFF unknown)  



                                                  Stat                

 

           (unknown)  (no       (unknown)  (unknown)  Consult to  (units    (unk

nown)



                    date)                         General Surgery unknown)  



                                                  Stat                

 

           (unknown)  (no       (unknown)  (unknown)  Course    (units    (unkno

wn)



                    date)                                   unknown)  

 

           (unknown)  (no       (unknown)  (unknown)  Creatinine 0.81  (units   

 (unknown)



                    date)                         (0.66-1.25) mg/dL unknown)  

 

           (unknown)  (no       (unknown)  (unknown)  : 1960  (units   

 (unknown)



                    date)                         Acct:[id number] unknown)  

 

           (unknown)  (no       (unknown)  (unknown)  Date of Service:  (units  

  (unknown)



                    date)                         23  unknown)  

 

           (unknown)  (no       (unknown)  (unknown)  Departure  (units    (unkn

own)



                    date)                                   unknown)  

 

           (unknown)  (no       (unknown)  (unknown)  Discharge Plan  (units    

(unknown)



                    date)                                   unknown)  

 

           (unknown)  (no       (unknown)  (unknown)  Discontinued  (units    (u

nknown)



                    date)                         Medications unknown)  

 

           (unknown)  (no       (unknown)  (unknown)  Documented By:  (units    

(unknown)



                    date)                         BS        unknown)  

 

           (unknown)  (no       (unknown)  (unknown)  Documented By:  (units    

(unknown)



                    date)                         OW        unknown)  

 

           (unknown)  (no       (unknown)  (unknown)  ED Orders  (units    (unkn

own)



                    date)                                   unknown)  

 

           (unknown)  (no       (unknown)  (unknown)  ER Physician:  (units    (

unknown)



                    date)                         Leo Hernandez unknown)  



                                                  D.O.                

 

           (unknown)  (no       (unknown)  (unknown)  Effort +  (units    (unkno

wn)



                    date)                         Inspection: unknown)  



                                                  normal              



                                                  respiratory           



                                                  effort              

 

           (unknown)  (no       (unknown)  (unknown)  Emergency Report  (units  

  (unknown)



                    date)                                   unknown)  

 

           (unknown)  (no       (unknown)  (unknown)  Eos # (Auto) 200  (units  

  (unknown)



                    date)                         (0-450) /uL unknown)  

 

           (unknown)  (no       (unknown)  (unknown)  Eos % (Auto) 1.8  (units  

  (unknown)



                    date)                         L (2-4) % unknown)  

 

           (unknown)  (no       (unknown)  (unknown)  Estimated GFR >  (units   

 (unknown)



                    date)                         60 (>60) mL/min unknown)  

 

           (unknown)  (no       (unknown)  (unknown)  Exam      (units    (unkno

wn)



                    date)                                   unknown)  

 

           (unknown)  (no       (unknown)  (unknown)  External: normal  (units  

  (unknown)



                    date)                         external exam, unknown)  



                                                  circumcised and           



                                                  hernia (Bilateral           



                                                  inguinal            

 

           (unknown)  (no       (unknown)  (unknown)  Extrem    (units    (unkno

wn)



                    date)                                   unknown)  

 

           (unknown)  (no       (unknown)  (unknown)  Eddie as  (units    (un

known)



                    date)                         there is concern unknown)  



                                                  for                 



                                                  strangulation.           



                                                  Plan to be is to           



                                                  admit to the           

 

           (unknown)  (no       (unknown)  (unknown)  GI        (units    (unkno

wn)



                    date)                                   unknown)  

 

           (unknown)  (no       (unknown)  (unknown)          (units    (unkno

wn)



                    date)                                   unknown)  

 

           (unknown)  (no       (unknown)  (unknown)  General   (units    (unkno

wn)



                    date)                                   unknown)  

 

           (unknown)  (no       (unknown)  (unknown)  General:  (units    (unkno

wn)



                    date)                         bimanual renal unknown)  



                                                  exam normal           



                                                  bilaterally           

 

           (unknown)  (no       (unknown)  (unknown)  General: no  (units    (un

known)



                    date)                         rashes or lesions unknown)  



                                                  noted               

 

           (unknown)  (no       (unknown)  (unknown)  General: normal  (units   

 (unknown)



                    date)                         to inspection and unknown)  



                                                  capillary refill           



                                                  normal              

 

           (unknown)  (no       (unknown)  (unknown)  Glucose 192 H  (units    (

unknown)



                    date)                         () mg/dL unknown)  

 

           (unknown)  (no       (unknown)  (unknown)  HENMT     (units    (unkno

wn)



                    date)                                   unknown)  

 

           (unknown)  (no       (unknown)  (unknown)  HPI - General  (units    (

unknown)



                    date)                         Adult     unknown)  

 

           (unknown)  (no       (unknown)  (unknown)  HPI narrative:  (units    

(unknown)



                    date)                                   unknown)  

 

           (unknown)  (no       (unknown)  (unknown)  Hct 46.6 (41-53)  (units  

  (unknown)



                    date)                         %         unknown)  

 

           (unknown)  (no       (unknown)  (unknown)  Head: normal to  (units   

 (unknown)



                    date)                         inspection and unknown)  



                                                  normocephalic           

 

           (unknown)  (no       (unknown)  (unknown)  Hgb 16.3  (units    (unkno

wn)



                    date)                         (13.5-17.5) g/dL unknown)  

 

           (unknown)  (no       (unknown)  (unknown)  History of  (units    (unk

nown)



                    date)                         Present Illness unknown)  

 

           (unknown)  (no       (unknown)  (unknown)  Hydromorphone  (units    (

unknown)



                    date)                         HCl       unknown)  



                                                  (Hydromorphone           



                                                  0.5 Mg Inj) 1 mg           



                                                  IV NOW ONE           

 

           (unknown)  (no       (unknown)  (unknown)  Imaging Data  (units    (u

nknown)



                    date)                                   unknown)  

 

           (unknown)  (no       (unknown)  (unknown)  Incarcerated  (units    (u

nknown)



                    date)                         inguinal hernia unknown)  

 

           (unknown)  (no       (unknown)  (unknown)  Initial Vital  (units    (

unknown)



                    date)                         Signs     unknown)  

 

           (unknown)  (no       (unknown)  (unknown)  Initial Vital  (units    (

unknown)



                    date)                         Signs:    unknown)  

 

           (unknown)  (no       (unknown)  (unknown)  Inspection:  (units    (un

known)



                    date)                         non-distended unknown)  

 

           (unknown)  (no       (unknown)  (unknown)  [facility]  (units   

 (unknown)



                    date)                         [address] unknown)  



                                                  [place], [place]           



                                                  [id number]               

 

           (unknown)  (no       (unknown)  (unknown)  Lab Data  (units    (unkno

wn)



                    date)                                   unknown)  

 

           (unknown)  (no       (unknown)  (unknown)  Lab Results  (units    (un

known)



                    date)                                   unknown)  

 

           (unknown)  (no       (unknown)  (unknown)  Lab results  (units    (un

known)



                    date)                         reviewed: Yes I unknown)  



                                                  reviewed the           



                                                  patient's lab           



                                                  results.            

 

           (unknown)  (no       (unknown)  (unknown)  Labs:     (units    (unkno

wn)



                    date)                                   unknown)  

 

           (unknown)  (no       (unknown)  (unknown)  Last Admin:  (units    (un

known)



                    date)                         23 22:28 unknown)  



                                                  Dose: 125 mls/hr           

 

           (unknown)  (no       (unknown)  (unknown)  Last Admin:  (units    (un

known)



                    date)                         23 22:28 unknown)  



                                                  Dose: 4 mg           

 

           (unknown)  (no       (unknown)  (unknown)  Last Admin:  (units    (un

known)



                    date)                         23 22:41 unknown)  



                                                  Dose: Not Given           

 

           (unknown)  (no       (unknown)  (unknown)  Last Admin:  (units    (un

known)



                    date)                         23 23:53 unknown)  



                                                  Dose: 4 mg           

 

           (unknown)  (no       (unknown)  (unknown)  Last Admin:  (units    (un

known)



                    date)                         23 23:54 unknown)  



                                                  Dose: 1 mg           

 

           (unknown)  (no       (unknown)  (unknown)  Last Admin:  (units    (un

known)



                    date)                         23 23:58 unknown)  



                                                  Dose: 4 mg           

 

           (unknown)  (no       (unknown)  (unknown)  Last Admin:  (units    (un

known)



                    date)                         23 00:37 unknown)  



                                                  Dose: 1 mg           

 

           (unknown)  (no       (unknown)  (unknown)  Lorazepam  (units    (unkn

own)



                    date)                         (Lorazepam 2 unknown)  



                                                  Mg/Ml Inj) 1 mg           



                                                  IV NOW ONE           

 

           (unknown)  (no       (unknown)  (unknown)  Lymph # (Auto)  (units    

(unknown)



                    date)                         1700 (1026-4342) unknown)  



                                                  /uL                 

 

           (unknown)  (no       (unknown)  (unknown)  Lymph % (Auto)  (units    

(unknown)



                    date)                         19.6 L (25-40) % unknown)  

 

           (unknown)  (no       (unknown)  (unknown)  MCH 31.7 (26-34)  (units  

  (unknown)



                    date)                         PG        unknown)  

 

           (unknown)  (no       (unknown)  (unknown)  MCHC 34.9  (units    (unkn

own)



                    date)                         (30-36) % unknown)  

 

           (unknown)  (no       (unknown)  (unknown)  MCV 90.8  (units    (unkno

wn)



                    date)                         () fL unknown)  

 

           (unknown)  (no       (unknown)  (unknown)  MDM Narrative  (units    (

unknown)



                    date)                                   unknown)  

 

           (unknown)  (no       (unknown)  (unknown)  Medical Decision  (units  

  (unknown)



                    date)                         Making    unknown)  

 

           (unknown)  (no       (unknown)  (unknown)  Medical decision  (units  

  (unknown)



                    date)                         making narrative: unknown)  

 

           (unknown)  (no       (unknown)  (unknown)  Mode of arrival:  (units  

  (unknown)



                    date)                         EMS       unknown)  

 

           (unknown)  (no       (unknown)  (unknown)  Mono # (Auto)  (units    (

unknown)



                    date)                         500 (0-900) /uL unknown)  

 

           (unknown)  (no       (unknown)  (unknown)  Mono % (Auto)  (units    (

unknown)



                    date)                         6.2 (3-14) % unknown)  

 

           (unknown)  (no       (unknown)  (unknown)  Morphine Sulfate  (units  

  (unknown)



                    date)                         (Morphine 2 Mg/Ml unknown)  



                                                  Inj) 2 mg IV Q4HR           



                                                  PRN                 

 

           (unknown)  (no       (unknown)  (unknown)  Morphine Sulfate  (units  

  (unknown)



                    date)                         (Morphine 4 Mg/Ml unknown)  



                                                  Inj) 4 mg IV NOW           



                                                  ONE                 

 

           (unknown)  (no       (unknown)  (unknown)  Neut # (Auto)  (units    (

unknown)



                    date)                         6300 (6947-1517) unknown)  



                                                  /uL                 

 

           (unknown)  (no       (unknown)  (unknown)  Neut % (Auto)  (units    (

unknown)



                    date)                         71.7 (50-75) % unknown)  

 

           (unknown)  (no       (unknown)  (unknown)  No Known Drug  (units    (

unknown)



                    date)                         Allergies Allergy unknown)  



                                                  Verified 23           



                                                  21:43               

 

           (unknown)  (no       (unknown)  (unknown)  No testicular  (units    (

unknown)



                    date)                         torsion   unknown)  

 

           (unknown)  (no       (unknown)  (unknown)  Once again I was  (units  

  (unknown)



                    date)                         able to reduce it unknown)  



                                                  somewhat but not           



                                                  completely.           



                                                  Patient was           

 

           (unknown)  (no       (unknown)  (unknown)  Ondansetron HCl  (units   

 (unknown)



                    date)                         (Ondansetron 4 unknown)  



                                                  Mg/2 Ml Inj) 4 mg           



                                                  IV Q4HR PRN           

 

           (unknown)  (no       (unknown)  (unknown)  Ordered:  (units    (o

wn)



                    date)                                   unknown)  

 

           (unknown)  (no       (unknown)  (unknown)  Orders    (units    (o

wn)



                    date)                                   unknown)  

 

           (unknown)  (no       (unknown)  (unknown)  Oxygen Delivery  (units   

 (unknown)



                    date)                         Method 23 unknown)  



                                                  21:25               

 

           (unknown)  (no       (unknown)  (unknown)  Oxygen Delivery  (units   

 (unknown)



                    date)                         Method Room Air unknown)  



                                                  Room Air            

 

           (unknown)  (no       (unknown)  (unknown)  PRN Reason:  (units    (un

known)



                    date)                         Nausea And unknown)  



                                                  Vomiting            

 

           (unknown)  (no       (unknown)  (unknown)  PRN Reason: pain  (units  

  (unknown)



                    date)                                   unknown)  

 

           (unknown)  (no       (unknown)  (unknown)  Palpation: soft,  (units  

  (unknown)



                    date)                         guarding, No unknown)  



                                                  rigid and tender           

 

           (unknown)  (no       (unknown)  (unknown)  Patient   (units    (o

wn)



                    date)                         Disposition: unknown)  



                                                  Admitted As           



                                                  Inpatient           

 

           (unknown)  (no       (unknown)  (unknown)  Patient   (units    (o

wn)



                    date)                         initially had unknown)  



                                                  obvious bilateral           



                                                  with right being           



                                                  greater than left           

 

           (unknown)  (no       (unknown)  (unknown)  Patient is a  (units    (u

)



                    date)                         62-year-old male. unknown)  



                                                  Has had known           



                                                  bilateral           



                                                  inguinal hernias           



                                                  for the             

 

           (unknown)  (no       (unknown)  (unknown)  Patient:  (units    (o

wn)



                    date)                         Harpal Fuller MR#: unknown)  



                                                  [id number]             

 

           (unknown)  (no       (unknown)  (unknown)  Penis: normal  (units    (

unknown)



                    date)                         penis     unknown)  

 

           (unknown)  (no       (unknown)  (unknown)  Plt Count 205  (units    (

unknown)



                    date)                         (150-400) X103/uL unknown)  

 

           (unknown)  (no       (unknown)  (unknown)  Potassium 4.2  (units    (

unknown)



                    date)                         (3.4-5.1) mmol/L unknown)  

 

           (unknown)  (no       (unknown)  (unknown)  Pulse Oximetry  (units    

(unknown)



                    date)                         94 23 21:25 unknown)  

 

           (unknown)  (no       (unknown)  (unknown)  Pulse Oximetry  (units    

(unknown)



                    date)                         94 97     unknown)  

 

           (unknown)  (no       (unknown)  (unknown)  Pulse Rate 73  (units    (

unknown)



                    date)                         23 21:25 unknown)  

 

           (unknown)  (no       (unknown)  (unknown)  Pulse Rate 73 78  (units  

  (unknown)



                    date)                                   unknown)  

 

           (unknown)  (no       (unknown)  (unknown)  RBC 5.13  (units    (unkno

wn)



                    date)                         (4.5-5.9) X106/uL unknown)  

 

           (unknown)  (no       (unknown)  (unknown)  RDW 14.0  (units    (unkno

wn)



                    date)                         (11.6-14.8) % unknown)  

 

           (unknown)  (no       (unknown)  (unknown) ROS Unobtainable:  (units  

  (unknown)



                    date)                         All systems unknown)  



                                                  reviewed + are           



                                                  unremarkable           



                                                  except as noted           



                                                  in HPI              

 

           (unknown)  (no       (unknown)  (unknown)  Radiologist's  (units    (

unknown)



                    date)                         Impression: unknown)  

 

           (unknown)  (no       (unknown)  (unknown)  Rate: regular  (units    (

unknown)



                    date)                         rate      unknown)  

 

           (unknown)  (no       (unknown)  (unknown)  Related Data  (units    (u

nknown)



                    date)                                   unknown)  

 

           (unknown)  (no       (unknown)  (unknown)  Resp      (units    (unkno

wn)



                    date)                                   unknown)  

 

           (unknown)  (no       (unknown)  (unknown)  Respiratory Rate  (units  

  (unknown)



                    date)                         20 23 21:25 unknown)  

 

           (unknown)  (no       (unknown)  (unknown)  Respiratory Rate  (units  

  (unknown)



                    date)                         20 20     unknown)  

 

           (unknown)  (no       (unknown)  (unknown)  Review of  (units    (unkn

own)



                    date)                         Systems   unknown)  

 

           (unknown)  (no       (unknown)  (unknown)  Rhythm: regular  (units   

 (unknown)



                    date)                         rhythm    unknown)  

 

           (unknown)  (no       (unknown)  (unknown)  Right-sided  (units    (un

known)



                    date)                         inguinal hernia unknown)  

 

           (unknown)  (no       (unknown)  (unknown)  Scrotal   (units    (unkno

wn)



                    date)                         ultrasound: unknown)  

 

           (unknown)  (no       (unknown)  (unknown)  Signed By:  (units    (unk

nown)



                    date)                                   unknown)  

 

           (unknown)  (no       (unknown)  (unknown)  Skin      (units    (unkno

wn)



                    date)                                   unknown)  

 

           (unknown)  (no       (unknown)  (unknown)  Sodium 138  (units    (unk

nown)



                    date)                         (137-145) mmol/L unknown)  

 

           (unknown)  (no       (unknown)  (unknown)  Sodium Chloride  (units   

 (unknown)



                    date)                         (Normal Saline unknown)  



                                                  0.9%) 1,000 mls @           



                                                  125 mls/hr IV           



                                                  CONT RICK            

 

           (unknown)  (no       (unknown)  (unknown)  Some nausea. No  (units   

 (unknown)



                    date)                         fevers. He unknown)  



                                                  contacted EMS           



                                                  because of the           



                                                  discomfort to           



                                                  bring               

 

           (unknown)  (no       (unknown)  (unknown)  Source: patient  (units   

 (unknown)



                    date)                                   unknown)  

 

           (unknown)  (no       (unknown)  (unknown)  Stated    (units    (unkno

wn)



                    date)                         complaint: RLQ/ unknown)  



                                                  Testical pain           

 

           (unknown)  (no       (unknown)  (unknown)  Stop: 23  (units    

(unknown)



                    date)                         22:10     unknown)  

 

           (unknown)  (no       (unknown)  (unknown)  Stop: 23  (units    

(unknown)



                    date)                         23:31     unknown)  

 

           (unknown)  (no       (unknown)  (unknown)  Stop: 23  (units    

(unknown)



                    date)                         23:40     unknown)  

 

           (unknown)  (no       (unknown)  (unknown)  Stop: 23  (units    

(unknown)



                    date)                         23:47     unknown)  

 

           (unknown)  (no       (unknown)  (unknown)  Temperature 98.2  (units  

  (unknown)



                    date)                         F 23 21:25 unknown)  

 

           (unknown)  (no       (unknown)  (unknown)  Temperature 98.2  (units  

  (unknown)



                    date)                         F         unknown)  

 

           (unknown)  (no       (unknown)  (unknown)  Testes: normal  (units    

(unknown)



                    date)                                   unknown)  

 

           (unknown)  (no       (unknown)  (unknown)  Time Seen by  (units    (u

nknown)



                    date)                         Provider: unknown)  



                                                  23 21:58           

 

           (unknown)  (no       (unknown)  (unknown)  Vital Signs - 8  (units   

 (unknown)



                    date)                         hr        unknown)  

 

           (unknown)  (no       (unknown)  (unknown)  Vital Signs  (units    (un

known)



                    date)                                   unknown)  

 

           (unknown)  (no       (unknown)  (unknown)  Vital signs:  (units    (u

nknown)



                    date)                                   unknown)  

 

           (unknown)  (no       (unknown)  (unknown)  WBC 8.8   (units    (unkno

wn)



                    date)                         (4.5-11.0) unknown)  



                                                  X103/uL             

 

           (unknown)  (no       (unknown)  (unknown)  [Embedded Image  (units   

 (unknown)



                    date)                         Not Available] unknown)  

 

           (unknown)  (no       (unknown)  (unknown)  again. He is no  (units   

 (unknown)



                    date)                         right testicular unknown)  



                                                  pain. I did           



                                                  discuss the case           



                                                  with             

 

           (unknown)  (no       (unknown)  (unknown)  agreement.  (units    (unk

nown)



                    date)                                   unknown)  

 

           (unknown)  (no       (unknown)  (unknown)  amount of  (units    (unkn

own)



                    date)                         symptoms that he unknown)  



                                                  is having patient           



                                                  still requires           



                                                  admission to the           

 

           (unknown)  (no       (unknown)  (unknown)  and below  (units    (unkn

own)



                    date)                                   unknown)  

 

           (unknown)  (no       (unknown)  (unknown)  and re-evaluated  (units  

  (unknown)



                    date)                         the patient. The unknown)  



                                                  right inguinal           



                                                  hernia was larger           



                                                  than before.           

 

           (unknown)  (no       (unknown)  (unknown)  being worse than  (units  

  (unknown)



                    date)                         the left. Is unknown)  



                                                  having              



                                                  generalized           



                                                  abdominal           



                                                  tenderness with           



                                                  it.                 

 

           (unknown)  (no       (unknown)  (unknown)  bulge out and  (units    (

unknown)



                    date)                         become painful unknown)  



                                                  but he is able to           



                                                  push them back           



                                                  in. He states he           

 

           (unknown)  (no       (unknown)  (unknown)  general surgery  (units   

 (unknown)



                    date)                         service for unknown)  



                                                  further             



                                                  evaluation and           



                                                  treatment of his           



                                                  symptomatic           

 

           (unknown)  (no       (unknown)  (unknown)  hernia but  (units    (unk

nown)



                    date)                         without signs of unknown)  



                                                  strangulation/obs           



                                                  truction/incarcer           



                                                  ation. Given the           

 

           (unknown)  (no       (unknown)  (unknown)  hernias)  (units    (unkno

wn)



                    date)                                   unknown)  

 

           (unknown)  (no       (unknown)  (unknown)  hernias. While  (units    

(unknown)



                    date)                         patient was unknown)  



                                                  boarding in the           



                                                  emergency           



                                                  department he had           



                                                  a fairly            

 

           (unknown)  (no       (unknown)  (unknown)  him to the  (units    (unk

nown)



                    date)                         emergency unknown)  



                                                  department.           

 

           (unknown)  (no       (unknown)  (unknown)  hospital for  (units    (u

)



                    date)                         surgical  unknown)  



                                                  intervention.           



                                                  Patient expressed           



                                                  understanding and           

 

           (unknown)  (no       (unknown)  (unknown)  inguinal  (units    (unkno

wn)



                    date)                         hernias. They unknown)  



                                                  were very tender           



                                                  to palpation and           



                                                  I was able to           



                                                  reduce              

 

           (unknown)  (no       (unknown)  (unknown)  is not having  (units    (

unknown)



                    date)                         any problems unknown)  



                                                  urinating. No           



                                                  change in bowel           



                                                  habits. Does           

 

           (unknown)  (no       (unknown)  (unknown)  it somewhat but  (units   

 (unknown)



                    date)                         not convinced unknown)  



                                                  that I could           



                                                  completely reduce           



                                                  the hernia. Also           

 

           (unknown)  (no       (unknown)  (unknown)  it? he states  (units    (

unknown)



                    date)                         that the hernias unknown)  



                                                  were protruding           



                                                  and they are           



                                                  hurting more than           

 

           (unknown)  (no       (unknown)  (unknown)  no other  (units    (unkno

wn)



                    date)                         intra-abdominal unknown)  



                                                  pathology and           



                                                  this did show a           



                                                  right-sided           



                                                  inguinal            

 

           (unknown)  (no       (unknown)  (unknown)  normal and he  (units    (

unknown)



                    date)                         can not push them unknown)  



                                                  back in. It is           



                                                  bilateral with           



                                                  the right side           

 

           (unknown)  (no       (unknown)  (unknown) occasionally have  (units  

  (unknown)



                    date)                         nausea and unknown)  



                                                  vomiting but it           



                                                  seems to not be           



                                                  associated with           



                                                  any                 

 

           (unknown)  (no       (unknown)  (unknown)  pain in his  (units    (un

known)



                    date)                         inguinal region. unknown)  



                                                  He states that           



                                                  today he missed a           



                                                  bus and had to           

 

           (unknown)  (no       (unknown)  (unknown)  past several  (units    (u

nknown)



                    date)                         years. He has unknown)  



                                                  been seen by his           



                                                  primary doctor           



                                                  the patient           



                                                  states              

 

           (unknown)  (no       (unknown)  (unknown)  specifically  (units    (u

nknown)



                    date)                         seen a surgeon unknown)  



                                                  about his           



                                                  hernias. He           



                                                  states that they           



                                                  frequently           

 

           (unknown)  (no       (unknown)  (unknown)  sudden onset of  (units   

 (unknown)



                    date)                         abdominal unknown)  



                                                  discomfort. Was           



                                                  vomiting once           



                                                  again. I went           



                                                  back                

 

           (unknown)  (no       (unknown)  (unknown)  that they have  (units    

(unknown)



                    date)                         been ?trying ?to unknown)  



                                                  get him in to see           



                                                  General surgery.           



                                                  Has not             

 

           (unknown)  (no       (unknown)  (unknown)  the ultrasound  (units    

(unknown)



                    date)                         was unremarkable. unknown)  



                                                  CT scan was           



                                                  ordered to           



                                                  confirm that           



                                                  there was           

 

           (unknown)  (no       (unknown)  (unknown)  ultrasound was  (units    

(unknown)



                    date)                         ordered to unknown)  



                                                  confirm that he           



                                                  did not have a           



                                                  testicular           



                                                  torsion and           

 

           (unknown)  (no       (unknown)  (unknown)  very      (units    (unkno

wn)



                    date)                         uncomfortable unknown)  



                                                  with this. More           



                                                  pain medication           



                                                  was ordered.           



                                                  Testicular           

 

           (unknown)  (no       (unknown)  (unknown)  walk for several  (units  

  (unknown)



                    date)                         hours/miles back unknown)  



                                                  home. He states           



                                                  he thinks that he           



                                                  ?overdid            

 

           (unknown)  (no       (unknown)  (unknown)  when the  (units    (unkno

wn)



                    date)                         pressure was unknown)  



                                                  taken off the           



                                                  area the hernia           



                                                  does came back           



                                                  out once            









                                         Result panel 84









           (unknown)  (no date)  (unknown)  (unknown)  Negative for  (units    (

unknown)



                                                  MRSA      unknown)  

 

           (unknown)  (no date)  (unknown)  (unknown)  Negative for  (units    (

unknown)



                                                  MRSA      unknown)  









                                         Result panel 85









           (unknown)  (no       (unknown)  (unknown)  (no value)  (units    (unk

nown)



                    date)                                   unknown)  

 

           (unknown)  (no       (unknown)  (unknown)  (past 8 hours):  (units   

 (unknown)



                    date)                                   unknown)  

 

           (unknown)  (no       (unknown)  (unknown)  23  (units    (unkno

wn)



                    date)                         23 unknown)  

 

           (unknown)  (no       (unknown)  (unknown)  23 21:40  (units    

(unknown)



                    date)                                   unknown)  

 

           (unknown)  (no       (unknown)  (unknown)  23  (units    (unkno

wn)



                    date)                                   unknown)  

 

           (unknown)  (no       (unknown)  (unknown)  09:19 23  (units    

(unknown)



                    date)                                   unknown)  

 

           (unknown)  (no       (unknown)  (unknown)  09:20 23  (units    

(unknown)



                    date)                                   unknown)  

 

           (unknown)  (no       (unknown)  (unknown)  09:20     (units    (unkno

wn)



                    date)                                   unknown)  

 

           (unknown)  (no       (unknown)  (unknown)  09:30 23  (units    

(unknown)



                    date)                                   unknown)  

 

           (unknown)  (no       (unknown)  (unknown)  09:30     (units    (unkno

wn)



                    date)                                   unknown)  

 

           (unknown)  (no       (unknown)  (unknown)  10:14     (units    (unkno

wn)



                    date)                                   unknown)  

 

           (unknown)  (no       (unknown)  (unknown)  12:35     (units    (unkno

wn)



                    date)                                   unknown)  

 

           (unknown)  (no       (unknown)  (unknown)  15 miles home  (units    (

unknown)



                    date)                         yesterday. Has unknown)  



                                                  been trying to           



                                                  see surgeon           



                                                  through [facility] for           



                                                  repair.             

 

           (unknown)  (no       (unknown)  (unknown)  [address]     (units    (unkno

wn)



                    date)                                   unknown)  

 

           (unknown)  (no       (unknown)  (unknown)  21:40 21:40  (units    (un

known)



                    date)                         22:15     unknown)  

 

           (unknown)  (no       (unknown)  (unknown)  Age/Sex: 62 / M  (units   

 (unknown)



                    date)                                   unknown)  

 

           (unknown)  (no       (unknown)  (unknown)  Allergies  (units    (unkn

own)



                    date)                                   unknown)  

 

           (unknown)  (no       (unknown)  (unknown)  Allergy/AdvReac  (units   

 (unknown)



                    date)                         Type Severity unknown)  



                                                  Reaction Status           



                                                  Date / Time           

 

           (unknown)  (no       (unknown)  (unknown)  Assessment +  (units    (u

nknown)



                    date)                         Plan      unknown)  

 

           (unknown)  (no       (unknown)  (unknown)  BUN 12    (units    (unkno

wn)



                    date)                                   unknown)  

 

           (unknown)  (no       (unknown)  (unknown)  BUN       (units    (unkno

wn)



                    date)                                   unknown)  

 

           (unknown)  (no       (unknown)  (unknown)  BUN/Creatinine  (units    

(unknown)



                    date)                         Ratio 14.8 unknown)  

 

           (unknown)  (no       (unknown)  (unknown)  BUN/Creatinine  (units    

(unknown)



                    date)                         Ratio     unknown)  

 

           (unknown)  (no       (unknown)  (unknown)  Baso # (Auto)  (units    (

unknown)



                    date)                         100       unknown)  

 

           (unknown)  (no       (unknown)  (unknown)  Baso # (Auto)  (units    (

unknown)



                    date)                                   unknown)  

 

           (unknown)  (no       (unknown)  (unknown)  Baso % (Auto)  (units    (

unknown)



                    date)                         0.7       unknown)  

 

           (unknown)  (no       (unknown)  (unknown)  Baso % (Auto)  (units    (

unknown)



                    date)                                   unknown)  

 

           (unknown)  (no       (unknown)  (unknown)  Been Physically  (units   

 (unknown)



                    date)                         Hurt or Yes unknown)  

 

           (unknown)  (no       (unknown)  (unknown)  Blood Pressure  (units    

(unknown)



                    date)                         141/87 H  unknown)  

 

           (unknown)  (no       (unknown)  (unknown)  Blood Pressure  (units    

(unknown)



                    date)                         [Left Arm] 141/87 unknown)  



                                                  H                   

 

           (unknown)  (no       (unknown)  (unknown)  Blood Pressure  (units    

(unknown)



                    date)                         [Left Arm] unknown)  

 

           (unknown)  (no       (unknown)  (unknown)  Blood Pressure  (units    

(unknown)



                    date)                                   unknown)  

 

           (unknown)  (no       (unknown)  (unknown)  Calcium 9.2  (units    (un

known)



                    date)                                   unknown)  

 

           (unknown)  (no       (unknown)  (unknown)  Calcium   (units    (unkno

wn)



                    date)                                   unknown)  

 

           (unknown)  (no       (unknown)  (unknown)  Carbon Dioxide  (units    

(unknown)



                    date)                         21 L      unknown)  

 

           (unknown)  (no       (unknown)  (unknown)  Carbon Dioxide  (units    

(unknown)



                    date)                                   unknown)  

 

           (unknown)  (no       (unknown)  (unknown)  Chest     (units    (unkno

wn)



                    date)                                   unknown)  

 

           (unknown)  (no       (unknown)  (unknown)  Chest: normal  (units    (

unknown)



                    date)                         inspection of the unknown)  



                                                  chest               

 

           (unknown)  (no       (unknown)  (unknown)  Chief complaint:  (units  

  (unknown)



                    date)                         RLQ/ Testical unknown)  



                                                  pain                

 

           (unknown)  (no       (unknown)  (unknown)  Chloride 105  (units    (u

nknown)



                    date)                                   unknown)  

 

           (unknown)  (no       (unknown)  (unknown)  Chloride  (units    (unkno

wn)



                    date)                                   unknown)  

 

           (unknown)  (no       (unknown)  (unknown)  Chronic   (units    (unkno

wn)



                    date)                         bilateral unknown)  



                                                  inguinal hernias.           



                                                  Had worsening           



                                                  pain in R side           



                                                  after walking           

 

           (unknown)  (no       (unknown)  (unknown)  Const     (units    (unkno

wn)



                    date)                                   unknown)  

 

           (unknown)  (no       (unknown)  (unknown)  Creatinine 0.81  (units   

 (unknown)



                    date)                                   unknown)  

 

           (unknown)  (no       (unknown)  (unknown)  Creatinine  (units    (unk

nown)



                    date)                                   unknown)  

 

           (unknown)  (no       (unknown)  (unknown)  Critical Care  (units    (

unknown)



                    date)                         time:     unknown)  

 

           (unknown)  (no       (unknown)  (unknown)  : 1960  (units   

 (unknown)



                    date)                         Acct:[id number] unknown)  

 

           (unknown)  (no       (unknown)  (unknown)  Date Patient  (units    (u

nknown)



                    date)                         Seen: 23 unknown)  

 

           (unknown)  (no       (unknown)  (unknown)  Date of Service:  (units  

  (unknown)



                    date)                         23  unknown)  

 

           (unknown)  (no       (unknown)  (unknown)  Ears: hearing  (units    (

unknown)



                    date)                         grossly normal unknown)  



                                                  bilaterally           

 

           (unknown)  (no       (unknown)  (unknown)  Environment  (units    (un

known)



                    date)                                   unknown)  

 

           (unknown)  (no       (unknown)  (unknown)  Eos # (Auto) 200  (units  

  (unknown)



                    date)                                   unknown)  

 

           (unknown)  (no       (unknown)  (unknown)  Eos # (Auto)  (units    (u

nknown)



                    date)                                   unknown)  

 

           (unknown)  (no       (unknown)  (unknown)  Eos % (Auto) 1.8  (units  

  (unknown)



                    date)                         L         unknown)  

 

           (unknown)  (no       (unknown)  (unknown)  Eos % (Auto)  (units    (u

nknown)



                    date)                                   unknown)  

 

           (unknown)  (no       (unknown)  (unknown)  Estimated GFR >  (units   

 (unknown)



                    date)                         60        unknown)  

 

           (unknown)  (no       (unknown)  (unknown)  Estimated GFR  (units    (

unknown)



                    date)                                   unknown)  

 

           (unknown)  (no       (unknown)  (unknown)  Exam Narrative:  (units   

 (unknown)



                    date)                                   unknown)  

 

           (unknown)  (no       (unknown)  (unknown)  Exam      (units    (unkno

wn)



                    date)                                   unknown)  

 

           (unknown)  (no       (unknown)  (unknown)  Eyes      (units    (unkno

wn)



                    date)                                   unknown)  

 

           (unknown)  (no       (unknown)  (unknown)  Face and sinus:  (units   

 (unknown)



                    date)                         normal facial unknown)  



                                                  exam                

 

           (unknown)  (no       (unknown)  (unknown)  Family + Social  (units   

 (unknown)



                    date)                         History   unknown)  

 

           (unknown)  (no       (unknown)  (unknown)  Feels Safe in  (units    (

unknown)



                    date)                         Current No unknown)  

 

           (unknown)  (no       (unknown)  (unknown)  General: healthy  (units  

  (unknown)



                    date)                         appearing, unknown)  



                                                  comfortable and           



                                                  combative           

 

           (unknown)  (no       (unknown)  (unknown)  Glucose 192 H  (units    (

unknown)



                    date)                                   unknown)  

 

           (unknown)  (no       (unknown)  (unknown)  Glucose   (units    (unkno

wn)



                    date)                                   unknown)  

 

           (unknown)  (no       (unknown)  (unknown)  HENMT     (units    (unkno

wn)



                    date)                                   unknown)  

 

           (unknown)  (no       (unknown)  (unknown)  Hct 46.6  (units    (unkno

wn)



                    date)                                   unknown)  

 

           (unknown)  (no       (unknown)  (unknown)  Hct       (units    (unkno

wn)



                    date)                                   unknown)  

 

           (unknown)  (no       (unknown)  (unknown)  Head: normal to  (units   

 (unknown)



                    date)                         inspection and unknown)  



                                                  normocephalic           

 

           (unknown)  (no       (unknown)  (unknown)  Hgb 16.3  (units    (unkno

wn)



                    date)                                   unknown)  

 

           (unknown)  (no       (unknown)  (unknown)  Hgb       (units    (unkno

wn)



                    date)                                   unknown)  

 

           (unknown)  (no       (unknown)  (unknown)  History +  (units    (unkn

own)



                    date)                         Physical Report unknown)  

 

           (unknown)  (no       (unknown)  (unknown)  History of  (units    (unk

nown)



                    date)                         Present Illness unknown)  

 

           (unknown)  (no       (unknown)  (unknown)  Home Medications  (units  

  (unknown)



                    date)                         and Allergies unknown)  

 

           (unknown)  (no       (unknown)  (unknown)  I spent a total  (units   

 (unknown)



                    date)                         of [] minutes of unknown)  



                                                  critical care           



                                                  time on this           



                                                  patient's care           

 

           (unknown)  (no       (unknown)  (unknown)  [facility]  (units   

 (unknown)



                    date)                         [address] unknown)  



                                                  [place], [place]           



                                                  [address]               

 

           (unknown)  (no       (unknown)  (unknown)  Laboratory  (units    (unk

nown)



                    date)                         Results - last 24 unknown)  



                                                  hr                  

 

           (unknown)  (no       (unknown)  (unknown)  Labs      (units    (unkno

wn)



                    date)                                   unknown)  

 

           (unknown)  (no       (unknown)  (unknown)  Labs:     (units    (unkno

wn)



                    date)                                   unknown)  

 

           (unknown)  (no       (unknown)  (unknown)  Limitations:  (units    (u

nknown)



                    date)                         behavioral unknown)  



                                                  limitations           

 

           (unknown)  (no       (unknown)  (unknown)  Lymph # (Auto)  (units    

(unknown)



                    date)                         1700      unknown)  

 

           (unknown)  (no       (unknown)  (unknown)  Lymph # (Auto)  (units    

(unknown)



                    date)                                   unknown)  

 

           (unknown)  (no       (unknown)  (unknown)  Lymph % (Auto)  (units    

(unknown)



                    date)                         19.6 L    unknown)  

 

           (unknown)  (no       (unknown)  (unknown)  Lymph % (Auto)  (units    

(unknown)



                    date)                                   unknown)  

 

           (unknown)  (no       (unknown)  (unknown)  MCH 31.7  (units    (unkno

wn)



                    date)                                   unknown)  

 

           (unknown)  (no       (unknown)  (unknown)  MCH       (units    (unkno

wn)



                    date)                                   unknown)  

 

           (unknown)  (no       (unknown)  (unknown)  MCHC 34.9  (units    (unkn

own)



                    date)                                   unknown)  

 

           (unknown)  (no       (unknown)  (unknown)  MCHC      (units    (unkno

wn)



                    date)                                   unknown)  

 

           (unknown)  (no       (unknown)  (unknown)  MCV 90.8  (units    (unkno

wn)



                    date)                                   unknown)  

 

           (unknown)  (no       (unknown)  (unknown)  MCV       (units    (unkno

wn)



                    date)                                   unknown)  

 

           (unknown)  (no       (unknown)  (unknown)  Meds      (units    (unkno

wn)



                    date)                                   unknown)  

 

           (unknown)  (no       (unknown)  (unknown)  Mono # (Auto)  (units    (

unknown)



                    date)                         500       unknown)  

 

           (unknown)  (no       (unknown)  (unknown)  Mono # (Auto)  (units    (

unknown)



                    date)                                   unknown)  

 

           (unknown)  (no       (unknown)  (unknown)  Mono % (Auto)  (units    (

unknown)



                    date)                         6.2       unknown)  

 

           (unknown)  (no       (unknown)  (unknown)  Mono % (Auto)  (units    (

unknown)



                    date)                                   unknown)  

 

           (unknown)  (no       (unknown)  (unknown)  Narrative  (units    (unkn

own)



                    date)                                   unknown)  

 

           (unknown)  (no       (unknown)  (unknown)  Narrative:  (units    (unk

nown)



                    date)                                   unknown)  

 

           (unknown)  (no       (unknown)  (unknown)  Nasal Screen  (units    (u

nknown)



                    date)                         MRSA (PCR) unknown)  



                                                  Negative for mrsa           

 

           (unknown)  (no       (unknown)  (unknown)  Nasal Screen  (units    (u

nknown)



                    date)                         MRSA (PCR) unknown)  

 

           (unknown)  (no       (unknown)  (unknown)  Neck      (units    (unkno

wn)



                    date)                                   unknown)  

 

           (unknown)  (no       (unknown)  (unknown)  Neck: trachea  (units    (

unknown)



                    date)                         midline   unknown)  

 

           (unknown)  (no       (unknown)  (unknown)  Neut # (Auto)  (units    (

unknown)



                    date)                         6300      unknown)  

 

           (unknown)  (no       (unknown)  (unknown)  Neut # (Auto)  (units    (

unknown)



                    date)                                   unknown)  

 

           (unknown)  (no       (unknown)  (unknown)  Neut % (Auto)  (units    (

unknown)



                    date)                         71.7      unknown)  

 

           (unknown)  (no       (unknown)  (unknown)  Neut % (Auto)  (units    (

unknown)



                    date)                                   unknown)  

 

           (unknown)  (no       (unknown)  (unknown)  No Known Drug  (units    (

unknown)



                    date)                         Allergies Allergy unknown)  



                                                  Verified 23           



                                                  21:43               

 

           (unknown)  (no       (unknown)  (unknown)  Nose: external  (units    

(unknown)



                    date)                         nose normal unknown)  

 

           (unknown)  (no       (unknown)  (unknown)  Nutritional  (units    (un

known)



                    date)                         Appearance: thin unknown)  

 

           (unknown)  (no       (unknown)  (unknown)  Objective  (units    (unkn

own)



                    date)                                   unknown)  

 

           (unknown)  (no       (unknown)  (unknown)  Orientation:  (units    (u

nknown)



                    date)                         alert, awake and unknown)  



                                                  oriented x3           

 

           (unknown)  (no       (unknown)  (unknown)  Oxygen Delivery  (units   

 (unknown)



                    date)                         Method Room Air unknown)  

 

           (unknown)  (no       (unknown)  (unknown)  Oxygen Delivery  (units   

 (unknown)



                    date)                         Method    unknown)  

 

           (unknown)  (no       (unknown)  (unknown)  Patient History  (units   

 (unknown)



                    date)                                   unknown)  

 

           (unknown)  (no       (unknown)  (unknown)  Patient:  (units    (unkno

wn)



                    date)                         Harpal Fuller unknown)  



                                                  MR#:            

 

           (unknown)  (no       (unknown)  (unknown)  Periorbital:  (units    (u

nknown)



                    date)                         periorbital unknown)  



                                                  findings normal           

 

           (unknown)  (no       (unknown)  (unknown)  Plt Count 205  (units    (

unknown)



                    date)                                   unknown)  

 

           (unknown)  (no       (unknown)  (unknown)  Plt Count  (units    (unkn

own)



                    date)                                   unknown)  

 

           (unknown)  (no       (unknown)  (unknown)  Potassium 4.2  (units    (

unknown)



                    date)                                   unknown)  

 

           (unknown)  (no       (unknown)  (unknown)  Potassium  (units    (unkn

own)



                    date)                                   unknown)  

 

           (unknown)  (no       (unknown)  (unknown)  Prior Living  (units    (u

nknown)



                    date)                         Arrangements unknown)  



                                                  Other               

 

           (unknown)  (no       (unknown)  (unknown)  Provider:  (units    (unkn

own)



                    date)                         Greta Morgan unknown)  



                                                  MD                  

 

           (unknown)  (no       (unknown)  (unknown)  Pulse Oximetry  (units    

(unknown)



                    date)                         97 99 97  unknown)  

 

           (unknown)  (no       (unknown)  (unknown)  Pulse Oximetry  (units    

(unknown)



                    date)                         97 99     unknown)  

 

           (unknown)  (no       (unknown)  (unknown)  Pulse Oximetry  (units    

(unknown)



                    date)                                   unknown)  

 

           (unknown)  (no       (unknown)  (unknown)  Pulse Rate 61 61  (units  

  (unknown)



                    date)                                   unknown)  

 

           (unknown)  (no       (unknown)  (unknown)  Pulse Rate 62 61  (units  

  (unknown)



                    date)                                   unknown)  

 

           (unknown)  (no       (unknown)  (unknown)  Pulse Rate  (units    (unk

nown)



                    date)                                   unknown)  

 

           (unknown)  (no       (unknown)  (unknown)  RBC 5.13  (units    (unkno

wn)



                    date)                                   unknown)  

 

           (unknown)  (no       (unknown)  (unknown)  RBC       (units    (unkno

wn)



                    date)                                   unknown)  

 

           (unknown)  (no       (unknown)  (unknown)  RDW 14.0  (units    (unkno

wn)



                    date)                                   unknown)  

 

           (unknown)  (no       (unknown)  (unknown)  RDW       (units    (unkno

wn)



                    date)                                   unknown)  

 

           (unknown)  (no       (unknown)  (unknown)  ROS: Yes All  (units    (u

nknown)



                    date)                         systems reviewed unknown)  



                                                  with the patient           



                                                  and are negative           



                                                  except as           

 

           (unknown)  (no       (unknown)  (unknown)  Respiratory Rate  (units  

  (unknown)



                    date)                         14 17     unknown)  

 

           (unknown)  (no       (unknown)  (unknown)  Respiratory Rate  (units  

  (unknown)



                    date)                         17 14     unknown)  

 

           (unknown)  (no       (unknown)  (unknown)  Respiratory Rate  (units  

  (unknown)



                    date)                                   unknown)  

 

           (unknown)  (no       (unknown)  (unknown)  Review of  (units    (unkn

own)



                    date)                         Systems   unknown)  

 

           (unknown)  (no       (unknown)  (unknown)  SARS-CoV-2 (PCR)  (units  

  (unknown)



                    date)                         Negative  unknown)  

 

           (unknown)  (no       (unknown)  (unknown)  SARS-CoV-2 (PCR)  (units  

  (unknown)



                    date)                                   unknown)  

 

           (unknown)  (no       (unknown)  (unknown)  Safety +  (units    (unkno

wn)



                    date)                         Behavioral: unknown)  

 

           (unknown)  (no       (unknown)  (unknown)  Signed By:  (units    (unk

nown)



                    date)                                   unknown)  

 

           (unknown)  (no       (unknown)  (unknown)  Sleeping, no  (units    (u

nknown)



                    date)                         distress. unknown)  

 

           (unknown)  (no       (unknown)  (unknown)  Smoking Status  (units    

(unknown)



                    date)                         Former smoker unknown)  

 

           (unknown)  (no       (unknown)  (unknown)  Smoking packs  (units    (

unknown)



                    date)                         per day 1 unknown)  

 

           (unknown)  (no       (unknown)  (unknown)  Social History:  (units   

 (unknown)



                    date)                                   unknown)  

 

           (unknown)  (no       (unknown)  (unknown)  Sodium 138  (units    (unk

nown)



                    date)                                   unknown)  

 

           (unknown)  (no       (unknown)  (unknown)  Sodium    (units    (unkno

wn)



                    date)                                   unknown)  

 

           (unknown)  (no       (unknown)  (unknown)  Substance Use  (units    (

unknown)



                    date)                         Type amphetamines unknown)  

 

           (unknown)  (no       (unknown)  (unknown)  Teeth and  (units    (unkn

own)



                    date)                         gingiva: poor unknown)  



                                                  dentition           

 

           (unknown)  (no       (unknown)  (unknown)  Temperature 97.9  (units  

  (unknown)



                    date)                         F         unknown)  

 

           (unknown)  (no       (unknown)  (unknown)  Temperature  (units    (un

known)



                    date)                                   unknown)  

 

           (unknown)  (no       (unknown)  (unknown)  Threatened By a  (units   

 (unknown)



                    date)                         Person    unknown)  

 

           (unknown)  (no       (unknown)  (unknown)  Time Patient  (units    (u

nknown)



                    date)                         Seen: 13:40 unknown)  

 

           (unknown)  (no       (unknown)  (unknown)  Time Spent With  (units   

 (unknown)



                    date)                         Patient   unknown)  

 

           (unknown)  (no       (unknown)  (unknown)  Tobacco +  (units    (unkn

own)



                    date)                         Substance use: unknown)  

 

           (unknown)  (no       (unknown)  (unknown)  Tobacco type  (units    (u

nknown)



                    date)                         cigarettes unknown)  

 

           (unknown)  (no       (unknown)  (unknown)  Vital Signs  (units    (un

known)



                    date)                                   unknown)  

 

           (unknown)  (no       (unknown)  (unknown)  WBC 8.8   (units    (unkno

wn)



                    date)                                   unknown)  

 

           (unknown)  (no       (unknown)  (unknown)  WBC       (units    (unkno

wn)



                    date)                                   unknown)  

 

           (unknown)  (no       (unknown)  (unknown)  [Embedded Image  (units   

 (unknown)



                    date)                         Not Available] unknown)  

 

           (unknown)  (no       (unknown)  (unknown)  alcohol intake  (units    

(unknown)



                    date)                         frequency other unknown)  

 

           (unknown)  (no       (unknown)  (unknown)  household  (units    (unkn

own)



                    date)                         members none unknown)  

 

           (unknown)  (no       (unknown)  (unknown)  otherwise  (units    (unkn

own)



                    date)                         documented unknown)  

 

           (unknown)  (no       (unknown)  (unknown)  today; this time  (units  

  (unknown)



                    date)                         is exclusive of unknown)  



                                                  procedural time.           









                                         Result panel 86









           (unknown)  (no       (unknown)  (unknown)  (no value)  (units    (unk

nown)



                    date)                                   unknown)  

 

           (unknown)  (no       (unknown)  (unknown)  (past 8 hours):  (units   

 (unknown)



                    date)                                   unknown)  

 

           (unknown)  (no       (unknown)  (unknown)  23  (units    (unkno

wn)



                    date)                         23 unknown)  

 

           (unknown)  (no       (unknown)  (unknown)  23 21:40  (units    

(unknown)



                    date)                                   unknown)  

 

           (unknown)  (no       (unknown)  (unknown)  23  (units    (unkno

wn)



                    date)                                   unknown)  

 

           (unknown)  (no       (unknown)  (unknown)  23 0939  (units    (

unknown)



                    date)                                   unknown)  

 

           (unknown)  (no       (unknown)  (unknown)  09:19 23  (units    

(unknown)



                    date)                                   unknown)  

 

           (unknown)  (no       (unknown)  (unknown)  09:20 23  (units    

(unknown)



                    date)                                   unknown)  

 

           (unknown)  (no       (unknown)  (unknown)  09:20     (units    (unkno

wn)



                    date)                                   unknown)  

 

           (unknown)  (no       (unknown)  (unknown)  09:30 23  (units    

(unknown)



                    date)                                   unknown)  

 

           (unknown)  (no       (unknown)  (unknown)  09:30     (units    (unkno

wn)



                    date)                                   unknown)  

 

           (unknown)  (no       (unknown)  (unknown)  10:14     (units    (unkno

wn)



                    date)                                   unknown)  

 

           (unknown)  (no       (unknown)  (unknown)  12:35     (units    (unkno

wn)



                    date)                                   unknown)  

 

           (unknown)  (no       (unknown)  (unknown)  15 miles home  (units    (

unknown)



                    date)                         yesterday. Has unknown)  



                                                  been trying to           



                                                  see surgeon           



                                                  through [facility] for           



                                                  repair.             

 

           (unknown)  (no       (unknown)  (unknown)  [id number]     (units    (unkno

wn)



                    date)                                   unknown)  

 

           (unknown)  (no       (unknown)  (unknown)  21:40 21:40  (units    (un

known)



                    date)                         22:15     unknown)  

 

           (unknown)  (no       (unknown)  (unknown)  Admitted for  (units    (u

nknown)



                    date)                         painful   unknown)  



                                                  incarcerated,           



                                                  possible            



                                                  strangulated vs           



                                                  obstructive.           

 

           (unknown)  (no       (unknown)  (unknown)  Affect: labile  (units    

(unknown)



                    date)                         affect    unknown)  

 

           (unknown)  (no       (unknown)  (unknown)  Age/Sex: 62 / M  (units   

 (unknown)



                    date)                                   unknown)  

 

           (unknown)  (no       (unknown)  (unknown)  Allergies  (units    (unkn

own)



                    date)                                   unknown)  

 

           (unknown)  (no       (unknown)  (unknown)  Allergy/AdvReac  (units   

 (unknown)



                    date)                         Type Severity unknown)  



                                                  Reaction Status           



                                                  Date / Time           

 

           (unknown)  (no       (unknown)  (unknown)  Assessment +  (units    (u

nknown)



                    date)                         Plan narrative: unknown)  

 

           (unknown)  (no       (unknown)  (unknown)  Assessment +  (units    (u

nknown)



                    date)                         Plan      unknown)  

 

           (unknown)  (no       (unknown)  (unknown)  BUN 12    (units    (unkno

wn)



                    date)                                   unknown)  

 

           (unknown)  (no       (unknown)  (unknown)  BUN       (units    (unkno

wn)



                    date)                                   unknown)  

 

           (unknown)  (no       (unknown)  (unknown)  BUN/Creatinine  (units    

(unknown)



                    date)                         Ratio 14.8 unknown)  

 

           (unknown)  (no       (unknown)  (unknown)  BUN/Creatinine  (units    

(unknown)



                    date)                         Ratio     unknown)  

 

           (unknown)  (no       (unknown)  (unknown)  Baso # (Auto)  (units    (

unknown)



                    date)                         100       unknown)  

 

           (unknown)  (no       (unknown)  (unknown)  Baso # (Auto)  (units    (

unknown)



                    date)                                   unknown)  

 

           (unknown)  (no       (unknown)  (unknown)  Baso % (Auto)  (units    (

unknown)



                    date)                         0.7       unknown)  

 

           (unknown)  (no       (unknown)  (unknown)  Baso % (Auto)  (units    (

unknown)



                    date)                                   unknown)  

 

           (unknown)  (no       (unknown)  (unknown)  Been Physically  (units   

 (unknown)



                    date)                         Hurt or Yes unknown)  

 

           (unknown)  (no       (unknown)  (unknown)  Between admit  (units    (

unknown)



                    date)                         and my exam, unknown)  



                                                  hernia's are           



                                                  reduced and w/o           



                                                  complication.           

 

           (unknown)  (no       (unknown)  (unknown)  Blood Pressure  (units    

(unknown)



                    date)                         141/87 H  unknown)  

 

           (unknown)  (no       (unknown)  (unknown)  Blood Pressure  (units    

(unknown)



                    date)                         [Left Arm] 141/87 unknown)  



                                                  H                   

 

           (unknown)  (no       (unknown)  (unknown)  Blood Pressure  (units    

(unknown)



                    date)                         [Left Arm] unknown)  

 

           (unknown)  (no       (unknown)  (unknown)  Blood Pressure  (units    

(unknown)



                    date)                                   unknown)  

 

           (unknown)  (no       (unknown)  (unknown)  Both inguinal  (units    (

unknown)



                    date)                         hernias   unknown)  



                                                  spontaneously           



                                                  reduced while           



                                                  lying flat           

 

           (unknown)  (no       (unknown)  (unknown)  COVID-19 status:  (units  

  (unknown)



                    date)                         Negative  unknown)  

 

           (unknown)  (no       (unknown)  (unknown)  COVID-19  (units    (unkno

wn)



                    date)                                   unknown)  

 

           (unknown)  (no       (unknown)  (unknown)  Calcium 9.2  (units    (un

known)



                    date)                                   unknown)  

 

           (unknown)  (no       (unknown)  (unknown)  Calcium   (units    (unkno

wn)



                    date)                                   unknown)  

 

           (unknown)  (no       (unknown)  (unknown)  Carbon Dioxide  (units    

(unknown)



                    date)                         21 L      unknown)  

 

           (unknown)  (no       (unknown)  (unknown)  Carbon Dioxide  (units    

(unknown)



                    date)                                   unknown)  

 

           (unknown)  (no       (unknown)  (unknown)  Cardio    (units    (unkno

wn)



                    date)                                   unknown)  

 

           (unknown)  (no       (unknown)  (unknown)  Chest     (units    (unkno

wn)



                    date)                                   unknown)  

 

           (unknown)  (no       (unknown)  (unknown)  Chest: normal  (units    (

unknown)



                    date)                         inspection of the unknown)  



                                                  chest               

 

           (unknown)  (no       (unknown)  (unknown)  Chief complaint:  (units  

  (unknown)



                    date)                         RLQ/ Testical unknown)  



                                                  pain                

 

           (unknown)  (no       (unknown)  (unknown)  Chloride 105  (units    (u

nknown)



                    date)                                   unknown)  

 

           (unknown)  (no       (unknown)  (unknown)  Chloride  (units    (unkno

wn)



                    date)                                   unknown)  

 

           (unknown)  (no       (unknown)  (unknown)  Chronic   (units    (unkno

wn)



                    date)                         bilateral unknown)  



                                                  inguinal hernias.           



                                                  Had worsening           



                                                  pain in R side           



                                                  after walking           

 

           (unknown)  (no       (unknown)  (unknown)  Const     (units    (unkno

wn)



                    date)                                   unknown)  

 

           (unknown)  (no       (unknown)  (unknown)  Creatinine 0.81  (units   

 (unknown)



                    date)                                   unknown)  

 

           (unknown)  (no       (unknown)  (unknown)  Creatinine  (units    (unk

nown)



                    date)                                   unknown)  

 

           (unknown)  (no       (unknown)  (unknown)  Critical Care  (units    (

unknown)



                    date)                         time:     unknown)  

 

           (unknown)  (no       (unknown)  (unknown)  : 1960  (units   

 (unknown)



                    date)                         Acct:[id number] unknown)  

 

           (unknown)  (no       (unknown)  (unknown)  Date Patient  (units    (u

nknown)



                    date)                         Seen: 23 unknown)  

 

           (unknown)  (no       (unknown)  (unknown)  Date of Service:  (units  

  (unknown)



                    date)                         23  unknown)  

 

           (unknown)  (no       (unknown)  (unknown)  Ears: hearing  (units    (

unknown)



                    date)                         grossly normal unknown)  



                                                  bilaterally           

 

           (unknown)  (no       (unknown)  (unknown)  Effort +  (units    (unkno

wn)



                    date)                         Inspection: unknown)  



                                                  normal              



                                                  respiratory           



                                                  effort and able           



                                                  to speak in           



                                                  complete            

 

           (unknown)  (no       (unknown)  (unknown)  Environment  (units    (un

known)



                    date)                                   unknown)  

 

           (unknown)  (no       (unknown)  (unknown)  Eos # (Auto) 200  (units  

  (unknown)



                    date)                                   unknown)  

 

           (unknown)  (no       (unknown)  (unknown)  Eos # (Auto)  (units    (u

nknown)



                    date)                                   unknown)  

 

           (unknown)  (no       (unknown)  (unknown)  Eos % (Auto) 1.8  (units  

  (unknown)



                    date)                         L         unknown)  

 

           (unknown)  (no       (unknown)  (unknown)  Eos % (Auto)  (units    (u

nknown)



                    date)                                   unknown)  

 

           (unknown)  (no       (unknown)  (unknown)  Estimated GFR >  (units   

 (unknown)



                    date)                         60        unknown)  

 

           (unknown)  (no       (unknown)  (unknown)  Estimated GFR  (units    (

unknown)



                    date)                                   unknown)  

 

           (unknown)  (no       (unknown)  (unknown)  Exam Narrative:  (units   

 (unknown)



                    date)                                   unknown)  

 

           (unknown)  (no       (unknown)  (unknown)  Exam      (units    (unkno

wn)



                    date)                                   unknown)  

 

           (unknown)  (no       (unknown)  (unknown)  Eyes      (units    (unkno

wn)



                    date)                                   unknown)  

 

           (unknown)  (no       (unknown)  (unknown)  Face and sinus:  (units   

 (unknown)



                    date)                         normal facial unknown)  



                                                  exam                

 

           (unknown)  (no       (unknown)  (unknown)  Family + Social  (units   

 (unknown)



                    date)                         History   unknown)  

 

           (unknown)  (no       (unknown)  (unknown)  Feels Safe in  (units    (

unknown)



                    date)                         Current No unknown)  

 

           (unknown)  (no       (unknown)  (unknown)  GI        (units    (unkno

wn)



                    date)                                   unknown)  

 

           (unknown)  (no       (unknown)  (unknown)          (units    (unkno

wn)



                    date)                                   unknown)  

 

           (unknown)  (no       (unknown)  (unknown)  General: healthy  (units  

  (unknown)



                    date)                         appearing, unknown)  



                                                  comfortable and           



                                                  combative           

 

           (unknown)  (no       (unknown)  (unknown)  General: patient  (units  

  (unknown)



                    date)                         alert, patient unknown)  



                                                  awake and patient           



                                                  oriented x3           

 

           (unknown)  (no       (unknown)  (unknown)  General: turgor  (units   

 (unknown)



                    date)                         normal    unknown)  

 

           (unknown)  (no       (unknown)  (unknown)  Glucose 192 H  (units    (

unknown)



                    date)                                   unknown)  

 

           (unknown)  (no       (unknown)  (unknown)  Glucose   (units    (unkno

wn)



                    date)                                   unknown)  

 

           (unknown)  (no       (unknown)  (unknown)  HENMT     (units    (unkno

wn)



                    date)                                   unknown)  

 

           (unknown)  (no       (unknown)  (unknown)  Hct 46.6  (units    (unkno

wn)



                    date)                                   unknown)  

 

           (unknown)  (no       (unknown)  (unknown)  Hct       (units    (unkno

wn)



                    date)                                   unknown)  

 

           (unknown)  (no       (unknown)  (unknown)  Head: normal to  (units   

 (unknown)



                    date)                         inspection and unknown)  



                                                  normocephalic           

 

           (unknown)  (no       (unknown)  (unknown)  Hgb 16.3  (units    (unkno

wn)



                    date)                                   unknown)  

 

           (unknown)  (no       (unknown)  (unknown)  Hgb       (units    (unkno

wn)



                    date)                                   unknown)  

 

           (unknown)  (no       (unknown)  (unknown)  History +  (units    (unkn

own)



                    date)                         Physical Report unknown)  

 

           (unknown)  (no       (unknown)  (unknown)  History of  (units    (unk

nown)



                    date)                         Present Illness unknown)  

 

           (unknown)  (no       (unknown)  (unknown)  Home Medications  (units  

  (unknown)



                    date)                         and Allergies unknown)  

 

           (unknown)  (no       (unknown)  (unknown)  I spent a total  (units   

 (unknown)



                    date)                         of [] minutes of unknown)  



                                                  critical care           



                                                  time on this           



                                                  patient's care           

 

           (unknown)  (no       (unknown)  (unknown)  [facility]  (units   

 (unknown)



                    date)                         [address] unknown)  



                                                  [place], [place]           



                                                  [address]               

 

           (unknown)  (no       (unknown)  (unknown)  Judgment: fair  (units    

(unknown)



                    date)                                   unknown)  

 

           (unknown)  (no       (unknown)  (unknown)  Laboratory  (units    (unk

nown)



                    date)                         Results - last 24 unknown)  



                                                  hr                  

 

           (unknown)  (no       (unknown)  (unknown)  Labs      (units    (unkno

wn)



                    date)                                   unknown)  

 

           (unknown)  (no       (unknown)  (unknown)  Labs:     (units    (unkno

wn)



                    date)                                   unknown)  

 

           (unknown)  (no       (unknown)  (unknown)  Limitations:  (units    (u

nknown)



                    date)                         behavioral unknown)  



                                                  limitations           

 

           (unknown)  (no       (unknown)  (unknown)  Lymph # (Auto)  (units    

(unknown)



                    date)                         1700      unknown)  

 

           (unknown)  (no       (unknown)  (unknown)  Lymph # (Auto)  (units    

(unknown)



                    date)                                   unknown)  

 

           (unknown)  (no       (unknown)  (unknown)  Lymph % (Auto)  (units    

(unknown)



                    date)                         19.6 L    unknown)  

 

           (unknown)  (no       (unknown)  (unknown)  Lymph % (Auto)  (units    

(unknown)



                    date)                                   unknown)  

 

           (unknown)  (no       (unknown)  (unknown)  MCH 31.7  (units    (unkno

wn)



                    date)                                   unknown)  

 

           (unknown)  (no       (unknown)  (unknown)  MCH       (units    (unkno

wn)



                    date)                                   unknown)  

 

           (unknown)  (no       (unknown)  (unknown)  MCHC 34.9  (units    (unkn

own)



                    date)                                   unknown)  

 

           (unknown)  (no       (unknown)  (unknown)  MCHC      (units    (unkno

wn)



                    date)                                   unknown)  

 

           (unknown)  (no       (unknown)  (unknown)  MCV 90.8  (units    (unkno

wn)



                    date)                                   unknown)  

 

           (unknown)  (no       (unknown)  (unknown)  MCV       (units    (unkno

wn)



                    date)                                   unknown)  

 

           (unknown)  (no       (unknown)  (unknown)  Meds      (units    (unkno

wn)



                    date)                                   unknown)  

 

           (unknown)  (no       (unknown)  (unknown)  Mental Status:  (units    

(unknown)



                    date)                         mental status unknown)  



                                                  grossly normal           

 

           (unknown)  (no       (unknown)  (unknown)  Mono # (Auto)  (units    (

unknown)



                    date)                         500       unknown)  

 

           (unknown)  (no       (unknown)  (unknown)  Mono # (Auto)  (units    (

unknown)



                    date)                                   unknown)  

 

           (unknown)  (no       (unknown)  (unknown)  Mono % (Auto)  (units    (

unknown)



                    date)                         6.2       unknown)  

 

           (unknown)  (no       (unknown)  (unknown)  Mono % (Auto)  (units    (

unknown)



                    date)                                   unknown)  

 

           (unknown)  (no       (unknown)  (unknown)  Mood: irritable  (units   

 (unknown)



                    date)                         mood      unknown)  

 

           (unknown)  (no       (unknown)  (unknown)  Narrative  (units    (unkn

own)



                    date)                                   unknown)  

 

           (unknown)  (no       (unknown)  (unknown)  Narrative:  (units    (unk

nown)



                    date)                                   unknown)  

 

           (unknown)  (no       (unknown)  (unknown)  Nasal Screen  (units    (u

nknown)



                    date)                         MRSA (PCR) unknown)  



                                                  Negative for mrsa           

 

           (unknown)  (no       (unknown)  (unknown)  Nasal Screen  (units    (u

nknown)



                    date)                         MRSA (PCR) unknown)  

 

           (unknown)  (no       (unknown)  (unknown)  Neck      (units    (unkno

wn)



                    date)                                   unknown)  

 

           (unknown)  (no       (unknown)  (unknown)  Neck: trachea  (units    (

unknown)



                    date)                         midline   unknown)  

 

           (unknown)  (no       (unknown)  (unknown)  Neuro     (units    (unkno

wn)



                    date)                                   unknown)  

 

           (unknown)  (no       (unknown)  (unknown)  Neut # (Auto)  (units    (

unknown)



                    date)                         6300      unknown)  

 

           (unknown)  (no       (unknown)  (unknown)  Neut # (Auto)  (units    (

unknown)



                    date)                                   unknown)  

 

           (unknown)  (no       (unknown)  (unknown)  Neut % (Auto)  (units    (

unknown)



                    date)                         71.7      unknown)  

 

           (unknown)  (no       (unknown)  (unknown)  Neut % (Auto)  (units    (

unknown)



                    date)                                   unknown)  

 

           (unknown)  (no       (unknown)  (unknown)  No Known Drug  (units    (

unknown)



                    date)                         Allergies Allergy unknown)  



                                                  Verified 23           



                                                  21:43               

 

           (unknown)  (no       (unknown)  (unknown)  Nose: external  (units    

(unknown)



                    date)                         nose normal unknown)  

 

           (unknown)  (no       (unknown)  (unknown)  Nutritional  (units    (un

known)



                    date)                         Appearance: thin unknown)  

 

           (unknown)  (no       (unknown)  (unknown)  Objective  (units    (unkn

own)



                    date)                                   unknown)  

 

           (unknown)  (no       (unknown)  (unknown)  Orientation:  (units    (u

nknown)



                    date)                         alert, awake and unknown)  



                                                  oriented x3           

 

           (unknown)  (no       (unknown)  (unknown)  Other:    (units    (unkno

wn)



                    date)                                   unknown)  

 

           (unknown)  (no       (unknown)  (unknown)  Oxygen Delivery  (units   

 (unknown)



                    date)                         Method Room Air unknown)  

 

           (unknown)  (no       (unknown)  (unknown)  Oxygen Delivery  (units   

 (unknown)



                    date)                         Method    unknown)  

 

           (unknown)  (no       (unknown)  (unknown)  Palpation: soft  (units   

 (unknown)



                    date)                                   unknown)  

 

           (unknown)  (no       (unknown)  (unknown)  Patient History  (units   

 (unknown)



                    date)                                   unknown)  

 

           (unknown)  (no       (unknown)  (unknown)  Patient:  (units    (unkno

wn)



                    date)                         Norma,Harpal E unknown)  



                                                  MR#:            

 

           (unknown)  (no       (unknown)  (unknown)  Penis: normal  (units    (

unknown)



                    date)                         penis     unknown)  

 

           (unknown)  (no       (unknown)  (unknown)  Periorbital:  (units    (u

nknown)



                    date)                         periorbital unknown)  



                                                  findings normal           

 

           (unknown)  (no       (unknown)  (unknown)  Plan: Patient  (units    (

unknown)



                    date)                         became very angry unknown)  



                                                  at the mere           



                                                  suggestion of not           



                                                  operating since           

 

           (unknown)  (no       (unknown)  (unknown)  Plt Count 205  (units    (

unknown)



                    date)                                   unknown)  

 

           (unknown)  (no       (unknown)  (unknown)  Plt Count  (units    (unkn

own)



                    date)                                   unknown)  

 

           (unknown)  (no       (unknown)  (unknown)  Potassium 4.2  (units    (

unknown)



                    date)                                   unknown)  

 

           (unknown)  (no       (unknown)  (unknown)  Potassium  (units    (unkn

own)



                    date)                                   unknown)  

 

           (unknown)  (no       (unknown)  (unknown)  Prior Living  (units    (u

nknown)



                    date)                         Arrangements unknown)  



                                                  Other               

 

           (unknown)  (no       (unknown)  (unknown)  Provider:  (units    (unkn

own)



                    date)                         Greta Morgan unknown)  



                                                  MD                  

 

           (unknown)  (no       (unknown)  (unknown)  Psych     (units    (unkno

wn)



                    date)                                   unknown)  

 

           (unknown)  (no       (unknown)  (unknown)  Pulse Oximetry  (units    

(unknown)



                    date)                         97 99 97  unknown)  

 

           (unknown)  (no       (unknown)  (unknown)  Pulse Oximetry  (units    

(unknown)



                    date)                         97 99     unknown)  

 

           (unknown)  (no       (unknown)  (unknown)  Pulse Oximetry  (units    

(unknown)



                    date)                                   unknown)  

 

           (unknown)  (no       (unknown)  (unknown)  Pulse Rate 61 61  (units  

  (unknown)



                    date)                                   unknown)  

 

           (unknown)  (no       (unknown)  (unknown)  Pulse Rate 62 61  (units  

  (unknown)



                    date)                                   unknown)  

 

           (unknown)  (no       (unknown)  (unknown)  Pulse Rate  (units    (unk

nown)



                    date)                                   unknown)  

 

           (unknown)  (no       (unknown)  (unknown)  RBC 5.13  (units    (unkno

wn)



                    date)                                   unknown)  

 

           (unknown)  (no       (unknown)  (unknown)  RBC       (units    (unkno

wn)



                    date)                                   unknown)  

 

           (unknown)  (no       (unknown)  (unknown)  RDW 14.0  (units    (unkno

wn)



                    date)                                   unknown)  

 

           (unknown)  (no       (unknown)  (unknown)  RDW       (units    (unkno

wn)



                    date)                                   unknown)  

 

           (unknown)  (no       (unknown)  (unknown)  ROS: Yes All  (units    (u

nknown)



                    date)                         systems reviewed unknown)  



                                                  with the patient           



                                                  and are negative           



                                                  except as           

 

           (unknown)  (no       (unknown)  (unknown)  Rate: regular  (units    (

unknown)



                    date)                         rate      unknown)  

 

           (unknown)  (no       (unknown)  (unknown)  Resp      (units    (unkno

wn)



                    date)                                   unknown)  

 

           (unknown)  (no       (unknown)  (unknown)  Respiratory Rate  (units  

  (unknown)



                    date)                         14 17     unknown)  

 

           (unknown)  (no       (unknown)  (unknown)  Respiratory Rate  (units  

  (unknown)



                    date)                         17 14     unknown)  

 

           (unknown)  (no       (unknown)  (unknown)  Respiratory Rate  (units  

  (unknown)



                    date)                                   unknown)  

 

           (unknown)  (no       (unknown)  (unknown)  Review of  (units    (unkn

own)



                    date)                         Systems   unknown)  

 

           (unknown)  (no       (unknown)  (unknown)  Rhythm: regular  (units   

 (unknown)



                    date)                         rhythm    unknown)  

 

           (unknown)  (no       (unknown)  (unknown)  SARS-CoV-2 (PCR)  (units  

  (unknown)



                    date)                         Negative  unknown)  

 

           (unknown)  (no       (unknown)  (unknown)  SARS-CoV-2 (PCR)  (units  

  (unknown)



                    date)                                   unknown)  

 

           (unknown)  (no       (unknown)  (unknown)  Safety +  (units    (unkno

wn)



                    date)                         Behavioral: unknown)  

 

           (unknown)  (no       (unknown)  (unknown)  Scrotum: scrotum  (units  

  (unknown)



                    date)                         normal    unknown)  

 

           (unknown)  (no       (unknown)  (unknown)  Signed    (units    (unkno

wn)



                    date)                         By:<Electronicall unknown)  



                                                  y signed by           



                                                  Greta Morgan MD>           

 

           (unknown)  (no       (unknown)  (unknown)  Skin      (units    (unkno

wn)



                    date)                                   unknown)  

 

           (unknown)  (no       (unknown)  (unknown)  Sleeping, no  (units    (u

nknown)



                    date)                         distress. Moves unknown)  



                                                  w/o hesitation           



                                                  when stretching.           

 

           (unknown)  (no       (unknown)  (unknown)  Smoking Status  (units    

(unknown)



                    date)                         Former smoker unknown)  

 

           (unknown)  (no       (unknown)  (unknown)  Smoking packs  (units    (

unknown)



                    date)                         per day 1 unknown)  

 

           (unknown)  (no       (unknown)  (unknown)  Social History:  (units   

 (unknown)



                    date)                                   unknown)  

 

           (unknown)  (no       (unknown)  (unknown)  Sodium 138  (units    (unk

nown)



                    date)                                   unknown)  

 

           (unknown)  (no       (unknown)  (unknown)  Sodium    (units    (unkno

wn)



                    date)                                   unknown)  

 

           (unknown)  (no       (unknown)  (unknown)  Speech and  (units    (unk

nown)



                    date)                         Movement: unknown)  



                                                  agitated            

 

           (unknown)  (no       (unknown)  (unknown)  Substance Use  (units    (

unknown)



                    date)                         Type amphetamines unknown)  

 

           (unknown)  (no       (unknown)  (unknown)  Teeth and  (units    (unkn

own)



                    date)                         gingiva: poor unknown)  



                                                  dentition           

 

           (unknown)  (no       (unknown)  (unknown)  Temperature 97.9  (units  

  (unknown)



                    date)                         F         unknown)  

 

           (unknown)  (no       (unknown)  (unknown)  Temperature  (units    (un

known)



                    date)                                   unknown)  

 

           (unknown)  (no       (unknown)  (unknown)  Thought Content:  (units  

  (unknown)



                    date)                         compulsions unknown)  

 

           (unknown)  (no       (unknown)  (unknown)  Threatened By a  (units   

 (unknown)



                    date)                         Person    unknown)  

 

           (unknown)  (no       (unknown)  (unknown)  Time Patient  (units    (u

nknown)



                    date)                         Seen: 13:40 unknown)  

 

           (unknown)  (no       (unknown)  (unknown)  Time Spent With  (units   

 (unknown)



                    date)                         Patient   unknown)  

 

           (unknown)  (no       (unknown)  (unknown)  Time with  (units    (unkn

own)



                    date)                         patient: 30 to 49 unknown)  



                                                  minutes with 50%           



                                                  spent               



                                                  counseling/coordi           



                                                  nating care           

 

           (unknown)  (no       (unknown)  (unknown)  Tobacco +  (units    (unkn

own)



                    date)                         Substance use: unknown)  

 

           (unknown)  (no       (unknown)  (unknown)  Tobacco type  (units    (u

nknown)



                    date)                         cigarettes unknown)  

 

           (unknown)  (no       (unknown)  (unknown)  Vital Signs  (units    (un

known)



                    date)                                   unknown)  

 

           (unknown)  (no       (unknown)  (unknown)  WBC 8.8   (units    (unkno

wn)



                    date)                                   unknown)  

 

           (unknown)  (no       (unknown)  (unknown)  WBC       (units    (unkno

wn)



                    date)                                   unknown)  

 

           (unknown)  (no       (unknown)  (unknown)  [Embedded Image  (units   

 (unknown)



                    date)                         Not Available] unknown)  

 

           (unknown)  (no       (unknown)  (unknown)  alcohol intake  (units    

(unknown)



                    date)                         frequency other unknown)  

 

           (unknown)  (no       (unknown)  (unknown)  for follow up at  (units  

  (unknown)



                    date)                         [facility]   unknown)  

 

           (unknown)  (no       (unknown)  (unknown)  household  (units    (unkn

own)



                    date)                         members none unknown)  

 

           (unknown)  (no       (unknown)  (unknown)  otherwise  (units    (unkn

own)



                    date)                         documented unknown)  

 

           (unknown)  (no       (unknown)  (unknown)  sentences  (units    (unkn

own)



                    date)                                   unknown)  

 

           (unknown)  (no       (unknown)  (unknown)  the issue had  (units    (

unknown)



                    date)                         resolved and unknown)  



                                                  there was a clear           



                                                  event that he can           



                                                  avoid for it not           

 

           (unknown)  (no       (unknown)  (unknown)  to happen again.  (units  

  (unknown)



                    date)                         Demanded to be unknown)  



                                                  discharged. And           



                                                  so he was           



                                                  discharged to           



                                                  home                

 

           (unknown)  (no       (unknown)  (unknown)  today; this time  (units  

  (unknown)



                    date)                         is exclusive of unknown)  



                                                  procedural time.           









                                         Result panel 87









           (unknown)  (no       (unknown)  (unknown)  (no value)  (units    (unk

nown)



                    date)                                   unknown)  

 

           (unknown)  (no       (unknown)  (unknown)  (past 8 hours):  (units   

 (unknown)



                    date)                                   unknown)  

 

           (unknown)  (no       (unknown)  (unknown)  23 21:40  (units    

(unknown)



                    date)                                   unknown)  

 

           (unknown)  (no       (unknown)  (unknown)  23 22:10  (units    

(unknown)



                    date)                                   unknown)  

 

           (unknown)  (no       (unknown)  (unknown)  23 22:11  (units    

(unknown)



                    date)                                   unknown)  

 

           (unknown)  (no       (unknown)  (unknown)  23 11:34  (units    

(unknown)



                    date)                                   unknown)  

 

           (unknown)  (no       (unknown)  (unknown)  23 0942  (units    (

unknown)



                    date)                                   unknown)  

 

           (unknown)  (no       (unknown)  (unknown)  [id number]     (units    (unkno

wn)



                    date)                                   unknown)  

 

           (unknown)  (no       (unknown)  (unknown)  Admitted for  (units    (u

nknown)



                    date)                         observation to unknown)  



                                                  rule out            



                                                  strangulated           



                                                  right inguinal           



                                                  hernia              

 

           (unknown)  (no       (unknown)  (unknown)  Age/Sex: 62 / M  (units   

 (unknown)



                    date)                                   unknown)  

 

           (unknown)  (no       (unknown)  (unknown)  Assessment and  (units    

(unknown)



                    date)                         Plan      unknown)  

 

           (unknown)  (no       (unknown)  (unknown)  Assessment:  (units    (un

known)



                    date)                                   unknown)  

 

           (unknown)  (no       (unknown)  (unknown)  Bilateral  (units    (unkn

own)



                    date)                         inguinal hernias unknown)  



                                                  w/o                 



                                                  incarceration,           



                                                  strangulation or           



                                                  obstruction           

 

           (unknown)  (no       (unknown)  (unknown)  Bilateral  (units    (unkn

own)



                    date)                         inguinal hernias unknown)  

 

           (unknown)  (no       (unknown)  (unknown)  Chief complaint:  (units  

  (unknown)



                    date)                         RLQ/ Testical unknown)  



                                                  pain                

 

           (unknown)  (no       (unknown)  (unknown)  Cognitive/behavi  (units  

  (unknown)



                    date)                         oral status at unknown)  



                                                  discharge:           



                                                  oriented            

 

           (unknown)  (no       (unknown)  (unknown)  Comment:  (units    (unkno

wn)



                    date)                                   unknown)  

 

           (unknown)  (no       (unknown)  (unknown)  Consult to  (units    (unk

nown)



                    date)                         General Surgery unknown)  



                                                  Stat                

 

           (unknown)  (no       (unknown)  (unknown)  Consult to [facility] -  (units  

  (unknown)



                    date)                          unknown)  



                                                  Routine             

 

           (unknown)  (no       (unknown)  (unknown)  Consulting  (units    (unk

nown)



                    date)                         Provider: unknown)  



                                                  Greta Morgan           

 

           (unknown)  (no       (unknown)  (unknown)  Consults:  (units    (unkn

own)



                    date)                                   unknown)  

 

           (unknown)  (no       (unknown)  (unknown)  : 1960  (units   

 (unknown)



                    date)                         Acct:[id number] unknown)  

 

           (unknown)  (no       (unknown)  (unknown)  Date Patient  (units    (u

nknown)



                    date)                         Seen: 23 unknown)  

 

           (unknown)  (no       (unknown)  (unknown)  Date of Service:  (units  

  (unknown)



                    date)                         23  unknown)  

 

           (unknown)  (no       (unknown)  (unknown)  Date of   (units    (unkno

wn)



                    date)                         admission: unknown)  

 

           (unknown)  (no       (unknown)  (unknown)  Diet/Activity/Tr  (units  

  (unknown)



                    date)                         eatments  unknown)  

 

           (unknown)  (no       (unknown)  (unknown)  Diet: Diet as  (units    (

unknown)



                    date)                         Tolerated unknown)  

 

           (unknown)  (no       (unknown)  (unknown)  Discharge  (units    (unkn

own)



                    date)                         Assessment + Plan unknown)  

 

           (unknown)  (no       (unknown)  (unknown)  Discharge Date:  (units   

 (unknown)



                    date)                         23  unknown)  

 

           (unknown)  (no       (unknown)  (unknown)  Discharge  (units    (unkn

own)



                    date)                         Diagnosis: unknown)  

 

           (unknown)  (no       (unknown)  (unknown)  Discharge Plan  (units    

(unknown)



                    date)                                   unknown)  

 

           (unknown)  (no       (unknown)  (unknown)  Discharge  (units    (unkn

own)



                    date)                         Providers unknown)  

 

           (unknown)  (no       (unknown)  (unknown)  Discharge  (units    (unkn

own)



                    date)                         Summary   unknown)  

 

           (unknown)  (no       (unknown)  (unknown)  Discharge home  (units    

(unknown)



                    date)                         with education on unknown)  



                                                  hernia reduction           



                                                  if they become           



                                                  'stuck' and           

 

           (unknown)  (no       (unknown)  (unknown)  Discharge  (units    (unkn

own)



                    date)                         provider: unknown)  

 

           (unknown)  (no       (unknown)  (unknown)  Exam Narrative:  (units   

 (unknown)



                    date)                                   unknown)  

 

           (unknown)  (no       (unknown)  (unknown)  Exam      (units    (unkno

wn)



                    date)                                   unknown)  

 

           (unknown)  (no       (unknown)  (unknown)  Functional  (units    (unk

nown)



                    date)                         status at unknown)  



                                                  discharge:           



                                                  independent           



                                                  ambulation           

 

           (unknown)  (no       (unknown)  (unknown)  Has provider  (units    (u

nknown)



                    date)                         been notified: unknown)  



                                                  Yes                 

 

           (unknown)  (no       (unknown)  (unknown)  History of  (units    (unk

nown)



                    date)                         Present Illness unknown)  

 

           (unknown)  (no       (unknown)  (unknown)  Hospital Course  (units   

 (unknown)



                    date)                                   unknown)  

 

           (unknown)  (no       (unknown)  (unknown)  Hospital Course:  (units  

  (unknown)



                    date)                                   unknown)  

 

           (unknown)  (no       (unknown)  (unknown)  Instructions: DI  (units  

  (unknown)



                    date)                         for Groin Hernia unknown)  

 

           (unknown)  (no       (unknown)  (unknown)  [facility]  (units   

 (unknown)



                    date)                         [address] unknown)  



                                                  [place], [place]           



                                                  [id number]               

 

           (unknown)  (no       (unknown)  (unknown)  Labs      (units    (unkno

wn)



                    date)                                   unknown)  

 

           (unknown)  (no       (unknown)  (unknown)  Narrative  (units    (unkn

own)



                    date)                                   unknown)  

 

           (unknown)  (no       (unknown)  (unknown)  Narrative:  (units    (unk

nown)



                    date)                                   unknown)  

 

           (unknown)  (no       (unknown)  (unknown)  Objective  (units    (unkn

own)



                    date)                                   unknown)  

 

           (unknown)  (no       (unknown)  (unknown)  Overall status  (units    

(unknown)



                    date)                         at discharge: unknown)  



                                                  patient is back           



                                                  to baseline           

 

           (unknown)  (no       (unknown)  (unknown)  Oxygen Delivery  (units   

 (unknown)



                    date)                         Method Room Air unknown)  

 

           (unknown)  (no       (unknown)  (unknown)  PFSH      (units    (unkno

wn)



                    date)                                   unknown)  

 

           (unknown)  (no       (unknown)  (unknown)  Patient   (units    (unkno

wn)



                    date)                         Disposition: Home unknown)  

 

           (unknown)  (no       (unknown)  (unknown)  Patient:  (units    (unkno

wn)



                    date)                         Harpal Fuller unknown)  



                                                  MR#:            

 

           (unknown)  (no       (unknown)  (unknown)  Greta  (units    (unkno

wn)



                    date)                         MD Eddie unknown)  

 

           (unknown)  (no       (unknown)  (unknown)  Plan of   (units    (unkno

wn)



                    date)                         Treatment: unknown)  

 

           (unknown)  (no       (unknown)  (unknown)  Provider  (units    (unkno

wn)



                    date)                                   unknown)  

 

           (unknown)  (no       (unknown)  (unknown)  Provider:  (units    (unkn

own)



                    date)                         Greta Morgan unknown)  



                                                  MD                  

 

           (unknown)  (no       (unknown)  (unknown)  Reason for  (units    (unk

nown)



                    date)                         consultation: unknown)  



                                                  Incarcerated           



                                                  inguinal hernia           

 

           (unknown)  (no       (unknown)  (unknown)  Signed    (units    (unkno

wn)



                    date)                         By:<Electronicall unknown)  



                                                  y signed by           



                                                  Greta Morgan MD>           

 

           (unknown)  (no       (unknown)  (unknown)  Sleeping, no  (units    (u

nknown)



                    date)                         distress. Moves unknown)  



                                                  w/o hesitation           



                                                  when stretching.           

 

           (unknown)  (no       (unknown)  (unknown)  Smoking Status:  (units   

 (unknown)



                    date)                         Former smoker unknown)  

 

           (unknown)  (no       (unknown)  (unknown)  Social History  (units    

(unknown)



                    date)                         (Reviewed unknown)  



                                                  23 @ 02:54           



                                                  by Leo Hernandez DO)           

 

           (unknown)  (no       (unknown)  (unknown)  Stand Alone  (units    (un

known)



                    date)                         Forms: Patient unknown)  



                                                  Portal/API           

 

           (unknown)  (no       (unknown)  (unknown)  Status at  (units    (unkn

own)



                    date)                         Discharge unknown)  

 

           (unknown)  (no       (unknown)  (unknown)  Summary   (units    (unkno

wn)



                    date)                                   unknown)  

 

           (unknown)  (no       (unknown)  (unknown)  Time Patient  (units    (u

nknown)



                    date)                         Seen: 13:00 unknown)  

 

           (unknown)  (no       (unknown)  (unknown)  Time Spent with  (units   

 (unknown)



                    date)                         Patient   unknown)  

 

           (unknown)  (no       (unknown)  (unknown)  Time spent: Less  (units  

  (unknown)



                    date)                         than 30 minutes unknown)  

 

           (unknown)  (no       (unknown)  (unknown)  Visit     (units    (unkno

wn)



                    date)                         Report/Discharge unknown)  



                                                  Packet              

 

           (unknown)  (no       (unknown)  (unknown)  Vital Signs  (units    (un

known)



                    date)                                   unknown)  

 

           (unknown)  (no       (unknown)  (unknown)  [Embedded Image  (units   

 (unknown)



                    date)                         Not Available] unknown)  

 

           (unknown)  (no       (unknown)  (unknown)  at [facility] for  (units  

  (unknown)



                    date)                         elective repair. unknown)  

 

           (unknown)  (no       (unknown)  (unknown)  household  (units    (unkn

own)



                    date)                         members: none unknown)  

 

           (unknown)  (no       (unknown)  (unknown)  painful again.  (units    

(unknown)



                    date)                         Return to ED if unknown)  



                                                  he is unable to           



                                                  resolve the           



                                                  issue. O/w follow           



                                                  up                  

 

           (unknown)  (no       (unknown)  (unknown)  unchanged from  (units    

(unknown)



                    date)                         progress note unknown)  







Social History







                     date                description         facility

 

                     2023 00:00    Unknown if ever smoked  [facility]

 

                     2023 00:00    Ex-smoker (finding)  [facility]







Vital Signs







                 date            measurement     value           units

 

                 2023 00:00  BMI             19.3            kg/m2

 

                 2023 00:00  height_metric   167.64          cm

 

                 2023 00:00  height_standard  66              in

 

                 2023 00:00  weight_metric   54.43           kg

 

                 2023 00:00  weight_standard  120             lb

 

                 2023 00:00  BP_diastolic    87              mmHg

 

                 2023 00:00  BP_systolic     141             mmHg

 

                 2023 00:00  heart_rate      61              /min

 

                 2023 00:00  height_metric   167.64          cm

 

                 2023 00:00  height_standard  66              in

 

                 2023 00:00  o2_saturation   99              %

 

                 2023 00:00  respiration_rate  17              /min

 

                 2023 00:00  temperature_metric  36.61           C

 

                 2023 00:00  temperature_standard  97.9            F

 

                 2023 00:00  weight_metric   54.43           kg

 

                 2023 00:00  weight_standard  120             lb
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
## 2023-12-18 ENCOUNTER — HOSPITAL ENCOUNTER (EMERGENCY)
Facility: MEDICAL CENTER | Age: 63
End: 2023-12-19
Attending: EMERGENCY MEDICINE
Payer: COMMERCIAL

## 2023-12-18 DIAGNOSIS — F32.A DEPRESSION, UNSPECIFIED DEPRESSION TYPE: ICD-10-CM

## 2023-12-18 LAB
AMPHET UR QL SCN: NEGATIVE
BARBITURATES UR QL SCN: NEGATIVE
BENZODIAZ UR QL SCN: NEGATIVE
BZE UR QL SCN: NEGATIVE
CANNABINOIDS UR QL SCN: POSITIVE
FENTANYL UR QL: NEGATIVE
METHADONE UR QL SCN: NEGATIVE
OPIATES UR QL SCN: NEGATIVE
OXYCODONE UR QL SCN: NEGATIVE
PCP UR QL SCN: NEGATIVE
POC BREATHALIZER: 0 PERCENT (ref 0–0.01)
PROPOXYPH UR QL SCN: NEGATIVE

## 2023-12-18 PROCEDURE — 80307 DRUG TEST PRSMV CHEM ANLYZR: CPT

## 2023-12-18 PROCEDURE — 99284 EMERGENCY DEPT VISIT MOD MDM: CPT

## 2023-12-18 PROCEDURE — 302970 POC BREATHALIZER: Performed by: EMERGENCY MEDICINE

## 2023-12-18 PROCEDURE — 90791 PSYCH DIAGNOSTIC EVALUATION: CPT

## 2023-12-18 PROCEDURE — 700102 HCHG RX REV CODE 250 W/ 637 OVERRIDE(OP): Performed by: EMERGENCY MEDICINE

## 2023-12-18 PROCEDURE — A9270 NON-COVERED ITEM OR SERVICE: HCPCS | Performed by: EMERGENCY MEDICINE

## 2023-12-18 RX ORDER — TRAZODONE HYDROCHLORIDE 50 MG/1
50 TABLET ORAL ONCE
Status: COMPLETED | OUTPATIENT
Start: 2023-12-18 | End: 2023-12-18

## 2023-12-18 RX ADMIN — TRAZODONE HYDROCHLORIDE 50 MG: 50 TABLET ORAL at 23:04

## 2023-12-19 VITALS
TEMPERATURE: 98 F | HEART RATE: 86 BPM | HEIGHT: 67 IN | SYSTOLIC BLOOD PRESSURE: 135 MMHG | RESPIRATION RATE: 16 BRPM | OXYGEN SATURATION: 98 % | WEIGHT: 130 LBS | BODY MASS INDEX: 20.4 KG/M2 | DIASTOLIC BLOOD PRESSURE: 84 MMHG

## 2023-12-19 PROCEDURE — A9270 NON-COVERED ITEM OR SERVICE: HCPCS | Performed by: EMERGENCY MEDICINE

## 2023-12-19 PROCEDURE — 700102 HCHG RX REV CODE 250 W/ 637 OVERRIDE(OP): Performed by: EMERGENCY MEDICINE

## 2023-12-19 RX ORDER — TRAZODONE HYDROCHLORIDE 50 MG/1
50 TABLET ORAL NIGHTLY
COMMUNITY
Start: 2023-12-11

## 2023-12-19 RX ORDER — SERTRALINE HYDROCHLORIDE 100 MG/1
100 TABLET, FILM COATED ORAL DAILY
COMMUNITY
Start: 2023-12-11

## 2023-12-19 RX ORDER — QUETIAPINE FUMARATE 50 MG/1
50 TABLET, FILM COATED ORAL
COMMUNITY
Start: 2023-12-11

## 2023-12-19 RX ORDER — ARIPIPRAZOLE 2 MG/1
2 TABLET ORAL DAILY
Status: DISCONTINUED | OUTPATIENT
Start: 2023-12-19 | End: 2023-12-19 | Stop reason: HOSPADM

## 2023-12-19 RX ORDER — ARIPIPRAZOLE 2 MG/1
2 TABLET ORAL DAILY
COMMUNITY

## 2023-12-19 RX ORDER — TAMSULOSIN HYDROCHLORIDE 0.4 MG/1
0.4 CAPSULE ORAL EVERY EVENING
COMMUNITY
Start: 2023-12-11

## 2023-12-19 RX ORDER — DIPHENHYDRAMINE HCL 25 MG
25 TABLET ORAL ONCE
Status: COMPLETED | OUTPATIENT
Start: 2023-12-19 | End: 2023-12-19

## 2023-12-19 RX ORDER — SERTRALINE HYDROCHLORIDE 100 MG/1
100 TABLET, FILM COATED ORAL DAILY
Status: DISCONTINUED | OUTPATIENT
Start: 2023-12-19 | End: 2023-12-19 | Stop reason: HOSPADM

## 2023-12-19 RX ORDER — HYDROXYZINE HYDROCHLORIDE 10 MG/1
10 TABLET, FILM COATED ORAL
COMMUNITY
Start: 2023-12-11

## 2023-12-19 RX ADMIN — SERTRALINE 100 MG: 100 TABLET, FILM COATED ORAL at 05:22

## 2023-12-19 RX ADMIN — ARIPIPRAZOLE 2 MG: 2 TABLET ORAL at 05:22

## 2023-12-19 RX ADMIN — DIPHENHYDRAMINE HYDROCHLORIDE 25 MG: 25 TABLET ORAL at 03:19

## 2023-12-19 NOTE — ED NOTES
Med rec complete per patient/home pharmacy  Allergies reviewed.   Patient denies any outpatient antibiotics in the last 30 days.   Anticoagulants taken in the last 14 days? No     Patients preferred [facility]: [facility]

## 2023-12-19 NOTE — ED NOTES
Bedside report received from off going RN/tech: JULES Norman, assumed care of patient.  POC discussed with patient. Call light within reach, all needs addressed at this time.       Fall risk interventions in place: Move the patient closer to the nurse's station, Patient's personal possessions are with in their safe reach, Place socks on patient, Give patient urinal if applicable, and Keep floor surfaces clean and dry (all applicable per [place] Fall risk assessment)   Continuous monitoring: Not Applicable   IVF/IV medications: Not Applicable   Oxygen: Room Air  Bedside sitter: Not Applicable   Isolation: Not Applicable
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
## 2023-12-19 NOTE — ED NOTES
Pt ready for discharge, instructions given, verbalizes understanding  Pt refuses to wait for taxi voucher to [facility], or to sign after visit summary. Pt walked off [facility] and headed towards the [facility]
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
